# Patient Record
Sex: FEMALE | Race: WHITE | URBAN - METROPOLITAN AREA
[De-identification: names, ages, dates, MRNs, and addresses within clinical notes are randomized per-mention and may not be internally consistent; named-entity substitution may affect disease eponyms.]

---

## 2017-03-07 ENCOUNTER — HISTORICAL (OUTPATIENT)
Dept: ADMINISTRATIVE | Facility: HOSPITAL | Age: 82
End: 2017-03-07

## 2017-06-13 ENCOUNTER — HISTORICAL (OUTPATIENT)
Dept: ADMINISTRATIVE | Facility: HOSPITAL | Age: 82
End: 2017-06-13

## 2017-06-13 LAB
ALBUMIN SERPL-MCNC: 3.8 GM/DL (ref 3.4–5)
ALP SERPL-CCNC: 81 UNIT/L (ref 46–116)
ALT SERPL-CCNC: 32 UNIT/L (ref 12–78)
AST SERPL-CCNC: 28 UNIT/L (ref 15–37)
BILIRUB SERPL-MCNC: 0.4 MG/DL (ref 0.2–1)
BILIRUBIN DIRECT+TOT PNL SERPL-MCNC: 0.13 MG/DL (ref 0–0.2)
BILIRUBIN DIRECT+TOT PNL SERPL-MCNC: 0.27 MG/DL (ref 0–0.8)
CRP SERPL-MCNC: <0.2 MG/DL (ref 0–0.9)
ERYTHROCYTE [DISTWIDTH] IN BLOOD BY AUTOMATED COUNT: 13.7 % (ref 11.5–17)
ERYTHROCYTE [SEDIMENTATION RATE] IN BLOOD: 5 MM/HR (ref 0–20)
HCT VFR BLD AUTO: 38.1 % (ref 37–47)
HGB BLD-MCNC: 12.5 GM/DL (ref 12–16)
MCH RBC QN AUTO: 33.2 PG (ref 27–31)
MCHC RBC AUTO-ENTMCNC: 33 GM/DL (ref 33–36)
MCV RBC AUTO: 100.8 FL (ref 80–94)
PLATELET # BLD AUTO: 225 X10(3)/MCL (ref 130–400)
PMV BLD AUTO: 8.8 FL (ref 7.4–10.4)
PROT SERPL-MCNC: 6.5 GM/DL (ref 6.4–8.2)
RBC # BLD AUTO: 3.78 X10(6)/MCL (ref 4.2–5.4)
WBC # SPEC AUTO: 8.8 X10(3)/MCL (ref 4.5–11.5)

## 2017-07-21 ENCOUNTER — HISTORICAL (OUTPATIENT)
Dept: ADMINISTRATIVE | Facility: HOSPITAL | Age: 82
End: 2017-07-21

## 2017-08-01 LAB
BUN SERPL-MCNC: 17 MG/DL (ref 7–18)
CALCIUM SERPL-MCNC: 9.7 MG/DL (ref 9–10.9)
CHLORIDE SERPL-SCNC: 103 MMOL/L (ref 98–116)
CHOLEST SERPL-MCNC: 139 MG/DL
CO2 SERPL-SCNC: 29 MMOL/L (ref 15–28)
CREAT SERPL-MCNC: 1.01 MG/DL (ref 0.6–1.3)
GLUCOSE SERPL-MCNC: 95 MG/DL (ref 74–106)
HDLC SERPL-MCNC: 54 MG/DL (ref 35–60)
LDLC SERPL CALC-MCNC: 50 MG/DL
POTASSIUM SERPL-SCNC: 4.2 MMOL/L (ref 3.5–5.1)
SODIUM SERPL-SCNC: 145 MEQ/L (ref 131–145)
TRIGL SERPL-MCNC: 176 MG/DL (ref 30–150)

## 2017-08-14 ENCOUNTER — HISTORICAL (OUTPATIENT)
Dept: ADMINISTRATIVE | Facility: HOSPITAL | Age: 82
End: 2017-08-14

## 2017-08-14 LAB
ABS NEUT (OLG): 8.92 X10(3)/MCL (ref 2.1–9.2)
APTT PPP: 27.7 SECOND(S) (ref 20.6–36)
BASOPHILS # BLD AUTO: 0 X10(3)/MCL (ref 0–0.2)
BASOPHILS NFR BLD AUTO: 0 %
BUN SERPL-MCNC: 22 MG/DL (ref 7–18)
CALCIUM SERPL-MCNC: 9.3 MG/DL (ref 8.5–10.1)
CHLORIDE SERPL-SCNC: 103 MMOL/L (ref 98–107)
CO2 SERPL-SCNC: 27 MMOL/L (ref 21–32)
CREAT SERPL-MCNC: 1.02 MG/DL (ref 0.55–1.02)
ERYTHROCYTE [DISTWIDTH] IN BLOOD BY AUTOMATED COUNT: 13.3 % (ref 11.5–17)
GLUCOSE SERPL-MCNC: 124 MG/DL (ref 74–106)
HCT VFR BLD AUTO: 35.3 % (ref 37–47)
HGB BLD-MCNC: 11.5 GM/DL (ref 12–16)
INR PPP: 1.12 (ref 0–1.27)
LYMPHOCYTES # BLD AUTO: 0.7 X10(3)/MCL (ref 0.6–4.6)
LYMPHOCYTES NFR BLD AUTO: 6 %
MCH RBC QN AUTO: 32.6 PG (ref 27–31)
MCHC RBC AUTO-ENTMCNC: 32.6 GM/DL (ref 33–36)
MCV RBC AUTO: 100 FL (ref 80–94)
MONOCYTES # BLD AUTO: 0.4 X10(3)/MCL (ref 0.1–1.3)
MONOCYTES NFR BLD AUTO: 4 %
NEUTROPHILS # BLD AUTO: 8.92 X10(3)/MCL (ref 2.1–9.2)
NEUTROPHILS NFR BLD AUTO: 88 %
PLATELET # BLD AUTO: 210 X10(3)/MCL (ref 130–400)
PMV BLD AUTO: 10.6 FL (ref 9.4–12.4)
POTASSIUM SERPL-SCNC: 4.4 MMOL/L (ref 3.5–5.1)
PROTHROMBIN TIME: 14.2 SECOND(S) (ref 12.1–14.2)
RBC # BLD AUTO: 3.53 X10(6)/MCL (ref 4.2–5.4)
SODIUM SERPL-SCNC: 138 MMOL/L (ref 136–145)
WBC # SPEC AUTO: 10.1 X10(3)/MCL (ref 4.5–11.5)

## 2017-09-06 ENCOUNTER — HISTORICAL (OUTPATIENT)
Dept: ADMINISTRATIVE | Facility: HOSPITAL | Age: 82
End: 2017-09-06

## 2017-09-06 LAB
ALBUMIN SERPL-MCNC: 3.4 GM/DL (ref 3.4–5)
ALP SERPL-CCNC: 107 UNIT/L (ref 46–116)
ALT SERPL-CCNC: 35 UNIT/L (ref 12–78)
AST SERPL-CCNC: 25 UNIT/L (ref 15–37)
BILIRUB SERPL-MCNC: 0.6 MG/DL (ref 0.2–1)
BILIRUBIN DIRECT+TOT PNL SERPL-MCNC: 0.18 MG/DL (ref 0–0.2)
BILIRUBIN DIRECT+TOT PNL SERPL-MCNC: 0.42 MG/DL (ref 0–0.8)
CRP SERPL-MCNC: <0.2 MG/DL (ref 0–0.9)
ERYTHROCYTE [DISTWIDTH] IN BLOOD BY AUTOMATED COUNT: 14.1 % (ref 11.5–17)
ERYTHROCYTE [SEDIMENTATION RATE] IN BLOOD: 6 MM/HR (ref 0–20)
HCT VFR BLD AUTO: 37.6 % (ref 37–47)
HGB BLD-MCNC: 12.4 GM/DL (ref 12–16)
MCH RBC QN AUTO: 33.2 PG (ref 27–31)
MCHC RBC AUTO-ENTMCNC: 33.1 GM/DL (ref 33–36)
MCV RBC AUTO: 100.2 FL (ref 80–94)
PLATELET # BLD AUTO: 195 X10(3)/MCL (ref 130–400)
PMV BLD AUTO: 8.2 FL (ref 7.4–10.4)
PROT SERPL-MCNC: 6.3 GM/DL (ref 6.4–8.2)
RBC # BLD AUTO: 3.75 X10(6)/MCL (ref 4.2–5.4)
WBC # SPEC AUTO: 8.2 X10(3)/MCL (ref 4.5–11.5)

## 2017-09-21 ENCOUNTER — HISTORICAL (OUTPATIENT)
Dept: ADMINISTRATIVE | Facility: HOSPITAL | Age: 82
End: 2017-09-21

## 2017-10-14 ENCOUNTER — HISTORICAL (OUTPATIENT)
Dept: ADMINISTRATIVE | Facility: HOSPITAL | Age: 82
End: 2017-10-14

## 2017-10-14 LAB
ABS NEUT (OLG): 5.23 X10(3)/MCL (ref 2.1–9.2)
ALBUMIN SERPL-MCNC: 3.3 GM/DL (ref 3.4–5)
ALBUMIN/GLOB SERPL: 1.1 RATIO (ref 1.1–2)
ALP SERPL-CCNC: 101 UNIT/L (ref 38–126)
ALT SERPL-CCNC: 31 UNIT/L (ref 12–78)
APPEARANCE, UA: CLEAR
AST SERPL-CCNC: 25 UNIT/L (ref 15–37)
BACTERIA SPEC CULT: NORMAL /HPF
BASOPHILS NFR BLD MANUAL: 1 % (ref 0–2)
BILIRUB SERPL-MCNC: 0.5 MG/DL (ref 0.2–1)
BILIRUB UR QL STRIP: NEGATIVE
BILIRUBIN DIRECT+TOT PNL SERPL-MCNC: 0.2 MG/DL (ref 0–0.5)
BILIRUBIN DIRECT+TOT PNL SERPL-MCNC: 0.3 MG/DL (ref 0–0.8)
BUN SERPL-MCNC: 19 MG/DL (ref 7–18)
CALCIUM SERPL-MCNC: 9.2 MG/DL (ref 8.5–10.1)
CHLORIDE SERPL-SCNC: 109 MMOL/L (ref 98–107)
CO2 SERPL-SCNC: 27 MMOL/L (ref 21–32)
COLOR UR: YELLOW
CREAT SERPL-MCNC: 0.8 MG/DL (ref 0.55–1.02)
EOSINOPHIL NFR BLD MANUAL: 6 % (ref 0–8)
ERYTHROCYTE [DISTWIDTH] IN BLOOD BY AUTOMATED COUNT: 13.4 % (ref 11.5–17)
GLOBULIN SER-MCNC: 3.1 GM/DL (ref 2.4–3.5)
GLUCOSE (UA): NEGATIVE
GLUCOSE SERPL-MCNC: 94 MG/DL (ref 74–106)
HCT VFR BLD AUTO: 39.1 % (ref 37–47)
HGB BLD-MCNC: 13 GM/DL (ref 12–16)
HGB UR QL STRIP: NEGATIVE
KETONES UR QL STRIP: NEGATIVE
LEUKOCYTE ESTERASE UR QL STRIP: NEGATIVE
LYMPHOCYTES NFR BLD MANUAL: 1 %
LYMPHOCYTES NFR BLD MANUAL: 26 % (ref 13–40)
MAGNESIUM SERPL-MCNC: 2.3 MG/DL (ref 1.8–2.4)
MCH RBC QN AUTO: 34.1 PG (ref 27–31)
MCHC RBC AUTO-ENTMCNC: 33.2 GM/DL (ref 33–36)
MCV RBC AUTO: 102.6 FL (ref 80–94)
MONOCYTES NFR BLD MANUAL: 6 % (ref 2–11)
NEUTROPHILS NFR BLD MANUAL: 60 % (ref 47–80)
NITRITE UR QL STRIP: NEGATIVE
PH UR STRIP: 6 [PH] (ref 5–9)
PLATELET # BLD AUTO: 198 X10(3)/MCL (ref 130–400)
PLATELET # BLD EST: NORMAL 10*3/UL
PMV BLD AUTO: 10.2 FL (ref 7.4–10.4)
POTASSIUM SERPL-SCNC: 3.7 MMOL/L (ref 3.5–5.1)
PROT SERPL-MCNC: 6.4 GM/DL (ref 6.4–8.2)
PROT UR QL STRIP: NEGATIVE
RBC # BLD AUTO: 3.81 X10(6)/MCL (ref 4.2–5.4)
RBC #/AREA URNS HPF: NORMAL /[HPF]
SODIUM SERPL-SCNC: 144 MMOL/L (ref 136–145)
SP GR UR STRIP: 1.01 (ref 1–1.03)
SQUAMOUS EPITHELIAL, UA: NORMAL
T4 FREE SERPL-MCNC: 1.24 NG/DL (ref 0.76–1.46)
TSH SERPL-ACNC: 0.37 MIU/ML (ref 0.36–3.74)
UROBILINOGEN UR STRIP-ACNC: 0.2
WBC # SPEC AUTO: 9.9 X10(3)/MCL (ref 4.5–11.5)
WBC #/AREA URNS HPF: NORMAL /[HPF]

## 2017-11-03 ENCOUNTER — HISTORICAL (OUTPATIENT)
Dept: ADMINISTRATIVE | Facility: HOSPITAL | Age: 82
End: 2017-11-03

## 2017-11-10 ENCOUNTER — HISTORICAL (OUTPATIENT)
Dept: ADMINISTRATIVE | Facility: HOSPITAL | Age: 82
End: 2017-11-10

## 2017-11-10 LAB
ALBUMIN SERPL-MCNC: 3.5 GM/DL (ref 3.4–5)
ALP SERPL-CCNC: 83 UNIT/L (ref 46–116)
ALT SERPL-CCNC: 28 UNIT/L (ref 12–78)
AST SERPL-CCNC: 26 UNIT/L (ref 15–37)
BILIRUB SERPL-MCNC: 0.5 MG/DL (ref 0.2–1)
BILIRUBIN DIRECT+TOT PNL SERPL-MCNC: 0.16 MG/DL (ref 0–0.2)
BILIRUBIN DIRECT+TOT PNL SERPL-MCNC: 0.35 MG/DL (ref 0–0.8)
CRP SERPL-MCNC: 0.3 MG/DL (ref 0–0.9)
ERYTHROCYTE [DISTWIDTH] IN BLOOD BY AUTOMATED COUNT: 14.4 % (ref 11.5–17)
ERYTHROCYTE [SEDIMENTATION RATE] IN BLOOD: 10 MM/HR (ref 0–20)
HCT VFR BLD AUTO: 34.7 % (ref 37–47)
HGB BLD-MCNC: 11.3 GM/DL (ref 12–16)
MCH RBC QN AUTO: 32.8 PG (ref 27–31)
MCHC RBC AUTO-ENTMCNC: 32.5 GM/DL (ref 33–36)
MCV RBC AUTO: 101.1 FL (ref 80–94)
PLATELET # BLD AUTO: 231 X10(3)/MCL (ref 130–400)
PMV BLD AUTO: 8.2 FL (ref 7.4–10.4)
PROT SERPL-MCNC: 6.3 GM/DL (ref 6.4–8.2)
RBC # BLD AUTO: 3.44 X10(6)/MCL (ref 4.2–5.4)
WBC # SPEC AUTO: 8.3 X10(3)/MCL (ref 4.5–11.5)

## 2018-01-18 ENCOUNTER — HISTORICAL (OUTPATIENT)
Dept: ADMINISTRATIVE | Facility: HOSPITAL | Age: 83
End: 2018-01-18

## 2018-01-18 LAB
ALBUMIN SERPL-MCNC: 3.5 GM/DL (ref 3.4–5)
ALP SERPL-CCNC: 89 UNIT/L (ref 46–116)
ALT SERPL-CCNC: 30 UNIT/L (ref 12–78)
AST SERPL-CCNC: 22 UNIT/L (ref 15–37)
BILIRUB SERPL-MCNC: 0.4 MG/DL (ref 0.2–1)
BILIRUBIN DIRECT+TOT PNL SERPL-MCNC: 0.11 MG/DL (ref 0–0.2)
BILIRUBIN DIRECT+TOT PNL SERPL-MCNC: 0.27 MG/DL (ref 0–0.8)
CRP SERPL-MCNC: 0.3 MG/DL (ref 0–0.9)
ERYTHROCYTE [DISTWIDTH] IN BLOOD BY AUTOMATED COUNT: 13.8 % (ref 11.5–17)
ERYTHROCYTE [SEDIMENTATION RATE] IN BLOOD: 7 MM/HR (ref 0–20)
HCT VFR BLD AUTO: 38.1 % (ref 37–47)
HGB BLD-MCNC: 12.5 GM/DL (ref 12–16)
MCH RBC QN AUTO: 32.4 PG (ref 27–31)
MCHC RBC AUTO-ENTMCNC: 32.9 GM/DL (ref 33–36)
MCV RBC AUTO: 98.7 FL (ref 80–94)
PLATELET # BLD AUTO: 205 X10(3)/MCL (ref 130–400)
PMV BLD AUTO: 8.1 FL (ref 7.4–10.4)
PROT SERPL-MCNC: 6.7 GM/DL (ref 6.4–8.2)
RBC # BLD AUTO: 3.86 X10(6)/MCL (ref 4.2–5.4)
WBC # SPEC AUTO: 8.2 X10(3)/MCL (ref 4.5–11.5)

## 2018-02-04 ENCOUNTER — HISTORICAL (OUTPATIENT)
Dept: ADMINISTRATIVE | Facility: HOSPITAL | Age: 83
End: 2018-02-04

## 2018-02-06 ENCOUNTER — HISTORICAL (OUTPATIENT)
Dept: ADMINISTRATIVE | Facility: HOSPITAL | Age: 83
End: 2018-02-06

## 2018-03-09 ENCOUNTER — HISTORICAL (OUTPATIENT)
Dept: ADMINISTRATIVE | Facility: HOSPITAL | Age: 83
End: 2018-03-09

## 2018-06-20 ENCOUNTER — HISTORICAL (OUTPATIENT)
Dept: ADMINISTRATIVE | Facility: HOSPITAL | Age: 83
End: 2018-06-20

## 2018-10-31 ENCOUNTER — HISTORICAL (OUTPATIENT)
Dept: ADMINISTRATIVE | Facility: HOSPITAL | Age: 83
End: 2018-10-31

## 2018-11-27 ENCOUNTER — HISTORICAL (OUTPATIENT)
Dept: ADMINISTRATIVE | Facility: HOSPITAL | Age: 83
End: 2018-11-27

## 2018-11-29 ENCOUNTER — HISTORICAL (OUTPATIENT)
Dept: ADMINISTRATIVE | Facility: HOSPITAL | Age: 83
End: 2018-11-29

## 2019-01-31 ENCOUNTER — HISTORICAL (OUTPATIENT)
Dept: ADMINISTRATIVE | Facility: HOSPITAL | Age: 84
End: 2019-01-31

## 2019-01-31 LAB
ALBUMIN SERPL-MCNC: 3.5 GM/DL (ref 3.4–5)
ALP SERPL-CCNC: 81 UNIT/L (ref 46–116)
ALT SERPL-CCNC: 29 UNIT/L (ref 12–78)
AST SERPL-CCNC: 21 UNIT/L (ref 15–37)
BILIRUB SERPL-MCNC: 0.5 MG/DL (ref 0.2–1)
BILIRUBIN DIRECT+TOT PNL SERPL-MCNC: 0.12 MG/DL (ref 0–0.2)
BILIRUBIN DIRECT+TOT PNL SERPL-MCNC: 0.38 MG/DL (ref 0–0.8)
CRP SERPL-MCNC: <0.2 MG/DL (ref 0–0.9)
ERYTHROCYTE [DISTWIDTH] IN BLOOD BY AUTOMATED COUNT: 13.6 % (ref 11.5–17)
ERYTHROCYTE [SEDIMENTATION RATE] IN BLOOD: 8 MM/HR (ref 0–20)
HCT VFR BLD AUTO: 36.7 % (ref 37–47)
HGB BLD-MCNC: 11.9 GM/DL (ref 12–16)
MCH RBC QN AUTO: 34.4 PG (ref 27–31)
MCHC RBC AUTO-ENTMCNC: 32.4 GM/DL (ref 33–36)
MCV RBC AUTO: 106.1 FL (ref 80–94)
PLATELET # BLD AUTO: 219 X10(3)/MCL (ref 130–400)
PMV BLD AUTO: 10.1 FL (ref 9.4–12.4)
PROT SERPL-MCNC: 6.5 GM/DL (ref 6.4–8.2)
RBC # BLD AUTO: 3.46 X10(6)/MCL (ref 4.2–5.4)
WBC # SPEC AUTO: 8.8 X10(3)/MCL (ref 4.5–11.5)

## 2019-03-12 ENCOUNTER — HISTORICAL (OUTPATIENT)
Dept: ADMINISTRATIVE | Facility: HOSPITAL | Age: 84
End: 2019-03-12

## 2019-03-27 ENCOUNTER — HISTORICAL (OUTPATIENT)
Dept: ADMINISTRATIVE | Facility: HOSPITAL | Age: 84
End: 2019-03-27

## 2019-03-27 LAB
ALBUMIN SERPL-MCNC: 3.7 GM/DL (ref 3.4–5)
ALP SERPL-CCNC: 66 UNIT/L (ref 46–116)
ALT SERPL-CCNC: 30 UNIT/L (ref 12–78)
AST SERPL-CCNC: 18 UNIT/L (ref 15–37)
BILIRUB SERPL-MCNC: 0.4 MG/DL (ref 0.2–1)
BILIRUBIN DIRECT+TOT PNL SERPL-MCNC: 0.16 MG/DL (ref 0–0.2)
BILIRUBIN DIRECT+TOT PNL SERPL-MCNC: 0.24 MG/DL (ref 0–0.8)
CRP SERPL-MCNC: <0.2 MG/DL (ref 0–0.9)
ERYTHROCYTE [DISTWIDTH] IN BLOOD BY AUTOMATED COUNT: 12.6 % (ref 11.5–17)
ERYTHROCYTE [SEDIMENTATION RATE] IN BLOOD: 2 MM/HR (ref 0–20)
HCT VFR BLD AUTO: 38 % (ref 37–47)
HGB BLD-MCNC: 12.5 GM/DL (ref 12–16)
MCH RBC QN AUTO: 33.9 PG (ref 27–31)
MCHC RBC AUTO-ENTMCNC: 32.9 GM/DL (ref 33–36)
MCV RBC AUTO: 103 FL (ref 80–94)
PLATELET # BLD AUTO: 241 X10(3)/MCL (ref 130–400)
PMV BLD AUTO: 9.5 FL (ref 9.4–12.4)
PROT SERPL-MCNC: 6.6 GM/DL (ref 6.4–8.2)
RBC # BLD AUTO: 3.69 X10(6)/MCL (ref 4.2–5.4)
WBC # SPEC AUTO: 9.3 X10(3)/MCL (ref 4.5–11.5)

## 2019-04-10 ENCOUNTER — HISTORICAL (OUTPATIENT)
Dept: ADMINISTRATIVE | Facility: HOSPITAL | Age: 84
End: 2019-04-10

## 2019-04-10 LAB
ALBUMIN SERPL-MCNC: 3.7 GM/DL (ref 3.4–5)
ALP SERPL-CCNC: 64 UNIT/L (ref 46–116)
ALT SERPL-CCNC: 32 UNIT/L (ref 12–78)
AST SERPL-CCNC: 22 UNIT/L (ref 15–37)
BILIRUB SERPL-MCNC: 0.4 MG/DL (ref 0.2–1)
BILIRUBIN DIRECT+TOT PNL SERPL-MCNC: 0.14 MG/DL (ref 0–0.2)
BILIRUBIN DIRECT+TOT PNL SERPL-MCNC: 0.26 MG/DL (ref 0–0.8)
CHOLEST SERPL-MCNC: 161 MG/DL (ref 0–200)
CHOLEST/HDLC SERPL: 2.6 {RATIO} (ref 0–4)
HDLC SERPL-MCNC: 61 MG/DL (ref 40–60)
LDLC SERPL CALC-MCNC: 76 MG/DL (ref 0–129)
PROT SERPL-MCNC: 6.7 GM/DL (ref 6.4–8.2)
TRIGL SERPL-MCNC: 121 MG/DL
VLDLC SERPL CALC-MCNC: 24 MG/DL

## 2019-12-16 ENCOUNTER — HISTORICAL (OUTPATIENT)
Dept: ADMINISTRATIVE | Facility: HOSPITAL | Age: 84
End: 2019-12-16

## 2019-12-16 LAB — TSH SERPL-ACNC: 1.13 MIU/ML (ref 0.36–3.74)

## 2020-02-14 ENCOUNTER — HISTORICAL (OUTPATIENT)
Dept: ADMINISTRATIVE | Facility: HOSPITAL | Age: 85
End: 2020-02-14

## 2020-03-13 ENCOUNTER — HISTORICAL (OUTPATIENT)
Dept: ADMINISTRATIVE | Facility: HOSPITAL | Age: 85
End: 2020-03-13

## 2020-03-16 ENCOUNTER — HISTORICAL (OUTPATIENT)
Dept: ADMINISTRATIVE | Facility: HOSPITAL | Age: 85
End: 2020-03-16

## 2020-06-04 ENCOUNTER — HISTORICAL (OUTPATIENT)
Dept: ADMINISTRATIVE | Facility: HOSPITAL | Age: 85
End: 2020-06-04

## 2020-06-29 ENCOUNTER — HISTORICAL (OUTPATIENT)
Dept: ADMINISTRATIVE | Facility: HOSPITAL | Age: 85
End: 2020-06-29

## 2020-07-17 ENCOUNTER — HISTORICAL (OUTPATIENT)
Dept: ADMINISTRATIVE | Facility: HOSPITAL | Age: 85
End: 2020-07-17

## 2020-09-01 ENCOUNTER — HISTORICAL (OUTPATIENT)
Dept: ADMINISTRATIVE | Facility: HOSPITAL | Age: 85
End: 2020-09-01

## 2020-09-25 ENCOUNTER — HISTORICAL (OUTPATIENT)
Dept: ADMINISTRATIVE | Facility: HOSPITAL | Age: 85
End: 2020-09-25

## 2020-11-05 ENCOUNTER — HISTORICAL (OUTPATIENT)
Dept: ADMINISTRATIVE | Facility: HOSPITAL | Age: 85
End: 2020-11-05

## 2020-12-08 ENCOUNTER — HISTORICAL (OUTPATIENT)
Dept: ADMINISTRATIVE | Facility: HOSPITAL | Age: 85
End: 2020-12-08

## 2020-12-15 ENCOUNTER — HISTORICAL (OUTPATIENT)
Dept: ADMINISTRATIVE | Facility: HOSPITAL | Age: 85
End: 2020-12-15

## 2022-09-13 NOTE — HISTORICAL [facility] CERNER
This is a historical note converted from Lily. Formatting and pictures may have been removed.  Please reference Lily for original formatting and attached multimedia. Chief Complaint  bilateral hip pain. no injury, pain for 3 weeks. Went to urgent care in [place] and again [facility], xrays taken and given an injection.  History of Present Illness  Patient comes in today complaining of bilateral hip pain. ?Patient states she also has some pain in her lower back. ?She denies any?specific trauma or recent falls. ?She is present here with family.? She will ambulate with a walker.? She denies any specific numbness or tingling down either legs though she does have pain going from her buttocks into her thighs.? She had a hip replacement in the past. ?She denies any groin pain. ?She denies any bowel bladder dysfunction.? This is been going on for the last 3+ weeks. ?She has had an injection with minimal relief.  Physical Exam  Vitals & Measurements  HR:?60(Peripheral)? BP:?178/87?  HT:?150?cm? HT:?150?cm? WT:?73.48?kg? WT:?73.48?kg? BMI:?32.66?  Bilateral lower extremities warm soft and warm. ?Skin is intact with no signs or symptoms of DVT or infection. ?She has no pain with motion of either hip she has a negative Stincfield exam bilaterally she has no groin pain. ?She is tender about the paraspinal muscles both on the left and right side as well as along the trochanteric area.? She is able to forward flex 60 degrees and extend 10 degrees. ?She is able to rotate 30 degrees. ?She has 5 out of 5 strength in L2 through S1 states intact light touch negative clonus?she does walk with a slight hesitant gait. ?X-rays?4 views of the lumbar spine demonstrates generalized spondylosis and degenerative disc disease.  Assessment/Plan  1.?Lumbar spondylosis?M47.816  ? At this time we discussed her physical exam and x-ray findings. ?I suspect her pain is likely coming from her lower back, her pain is symmetric?going into her hips and  thigh area. ?We have discussed starting off with physical therapy, she will continue anti-inflammatories with appropriate precautions. ?She will continue with her walker. ?I would like to see her back in 4 weeks to see how she is progressing.? Patient states she will call or return sooner if there is any new problems or difficulties.  Ordered:  1036F Current tobacco non-user, 11/29/18 9:17:00 CST  1125F Pain severity quantified, pain present, 11/29/18 9:17:00 CST  1170F Functional Status Assessment, 11/29/18 9:17:00 CST  3008F Body Mass Index (BMI), documented, 11/29/18 9:17:00 CST  3077F Most recent systolic blood pressure, greater than or equal to 140 mm Hg, 11/29/18 9:17:00 CST  3079F Most recent diastolic blood pressure, 80 - 89 mm Hg, 11/29/18 9:17:00 CST  Clinic Follow up, *Est. 12/27/18 3:00:00 CST, Order for future visit, Lumbar spondylosis  Lumbar degenerative disc disease, [facility] BB Sat  Office/Outpatient Visit Level 4 Established [id number] PC, Lumbar spondylosis  Lumbar degenerative disc disease, [facility], 11/29/18 9:17:00 CST  PT/OT External Referral, 11/29/18 9:17:00 CST, Lumbar spondylosis  Lumbar degenerative disc disease, Evaluate and Treat, 3 X Week, Patient has IV, Patient on O2, Droplet Precautions  ?  2.?Lumbar degenerative disc disease?M51.36  Ordered:  1036F Current tobacco non-user, 11/29/18 9:17:00 CST  1125F Pain severity quantified, pain present, 11/29/18 9:17:00 CST  1170F Functional Status Assessment, 11/29/18 9:17:00 CST  3008F Body Mass Index (BMI), documented, 11/29/18 9:17:00 CST  3077F Most recent systolic blood pressure, greater than or equal to 140 mm Hg, 11/29/18 9:17:00 CST  3079F Most recent diastolic blood pressure, 80 - 89 mm Hg, 11/29/18 9:17:00 CST  Clinic Follow up, *Est. 12/27/18 3:00:00 CST, Order for future visit, Lumbar spondylosis  Lumbar degenerative disc disease, [facility] BB Sat  Office/Outpatient Visit Level 4 Established [id number] PC, Lumbar spondylosis   Lumbar degenerative disc disease, [facility], 11/29/18 9:17:00 CST  PT/OT External Referral, 11/29/18 9:17:00 CST, Lumbar spondylosis  Lumbar degenerative disc disease, Evaluate and Treat, 3 X Week, Patient has IV, Patient on O2, Droplet Precautions  ?  Bilateral low back pain?M54.5  ?   Problem List/Past Medical History  Ongoing  Abnormal liver function tests(  Confirmed  )  Arthritis, rheumatoid  CAD (coronary artery disease)  Carotid artery disease  Constipation  H/O: viral illness(  Confirmed  )  [place] - Hard of hearing  Hyperlipidemia  Hypertension, essential(  Confirmed  )  Hypothyroidism(  Confirmed  )  Lumbar spondylosis  Macular degeneration of both eyes  Ongoing use of possibly toxic medication  Osteoporosis(  Confirmed  )  Peripheral neuropathy, idiopathic  Polyp of colon  Stented coronary artery  Historical  Aortic valve insufficiency  CAD  Carotid artery stenosis  Cataracts  Dyslipidemia  Htn  Hypothyroid  Macular degeneration of both eyes  MI - Myocardial infarction  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  RA  Thyroid disease  Procedure/Surgical History  Appendectomy (1945)  cataract surgery  H/O heart artery stent  hand surgery  History of bilateral hip replacements  History of cholecystectomy  hysterectomy  right hand  right wrist fusion   Medications  amLODIPine 5 mg oral tablet, See Instructions, 3 refills  aspirin 81 mg oral tablet, CHEWABLE, 81 mg= 1 tab(s), Oral, Daily  Calcium 600+D, Oral, At Bedtime  DuoNeb 0.5 mg-2.5 mg/3 mL inhalation solution, 3 mL, INH, QID  Fish Oil oral capsule, 1 cap(s), Oral, Daily  [place] oral tablet, See Instructions, 3 refills  gabapentin 300 mg oral capsule, 300 mg= 1 cap(s), Oral, At Bedtime, 11 refills  Humira 40 mg/0.8 mL subcutaneous kit, 40 mg= 0.8 mL, Subcutaneous, q2wk  isosorbide MONOnitrate 30 mg oral tablet, Extended Release, 30 mg= 1 tab(s), Oral, Daily  levothyroxine 75 mcg (0.075 mg) oral tablet, 75 mcg= 1 tab(s), Oral, Daily  Linzess 290 mcg oral  capsule, 290 mcg= 1 cap(s), Oral, Daily, 3 refills  lutein 20 mg oral tablet, 20 mg= 1 tab(s), Oral, Daily  methotrexate 2.5 mg oral tablet  metoprolol TARTrate 50 mg oral tablet (for Lopressor), 50 mg= 1 tab(s), Oral, BID  omeprazole 40 mg oral DR capsule, See Instructions, 3 refills  prednisONE 20 mg oral tablet, 20 mg= 1 tab(s), Oral, Daily  ranitidine 300 mg oral tablet, See Instructions, 3 refills  simvastatin 20 mg oral tablet, 20 mg= 1 tab(s), Oral, qPM, 3 refills  Thera-M oral tablet, 1 tab(s), Oral, Daily  traMADol 50 mg oral tablet, 50 mg= 1 tab(s), Oral, q4hr, PRN  Valium 5 mg oral tablet, See Instructions  vitamin A 10,000 units oral capsule  Vitamin B12, Oral, Daily  Vitamin C 1000 mg oral tablet, 1000 mg= 1 tab(s), Oral, Daily  Allergies  Darvocet A500?(upset stomach)  Mobic?(upset stomach)  Norco?(upset stomach)  Percocet 10/325?(upset stomach)  Percodan?(upset stomach)  Robaxin?(upset stomach)  oxyCODONE?(upset stomach)  zolpidem?(amnesia)  Social History  Alcohol - Denies Alcohol Use, 12/03/2013  Past, 1-2 times per year, 02/04/2018  Current, 1-2 times per month, 08/04/2015  Employment/School  Retired, 08/04/2015  Exercise  Exercise duration: 0., 08/04/2015  Home/Environment  Lives with Alone., 08/04/2015  Nutrition/Health  Regular, 08/04/2015  Sexual  Sexually active: No., 08/04/2016  Substance Abuse - Denies Substance Abuse, 12/03/2013  Never, 08/04/2015  Tobacco - Denies Tobacco Use, 12/03/2013  Former smoker, N/A, 11/29/2018  Former smoker, N/A, Stopped age 31 Years. Previous treatment: None., 11/23/2018  Family History  Acute myocardial infarction: Negative: Mother.  Cancer: Sister.  Cancer: Father.  Heart attack.: Brother.  Primary malignant neoplasm of lung: Brother.Negative: Father and Sister.  Stroke: Sister.  Immunizations  Vaccine Date Status Comments   pneumococcal 13-valent conjugate vaccine 01/16/2018 Given    influenza virus vaccine, inactivated 10/2017 Recorded [1/16/2018] per  patient is october   influenza virus vaccine, inactivated 10/18/2016 Given    influenza virus vaccine, inactivated 10/30/2015 Recorded    tetanus toxoid 05/28/2015 Recorded [8/4/2015] 0.5ML AT [facility]   zoster vaccine live 05/28/2015 Recorded [8/4/2015] GIVEN AT [facility] OUT OF STATE   influenza virus vaccine, inactivated 10/07/2014 Recorded    pneumococcal 23-polyvalent vaccine 06/15/2005 Recorded    Health Maintenance  Health Maintenance  ???Pending?(in the next year)  ??? ??OverDue  ??? ? ? ?Coronary Artery Disease Maintenance-Antiplatelet Agent Prescribed due??and every?  ??? ? ? ?Coronary Artery Disease Maintenance-Lipid Lowering Therapy due??and every?  ??? ? ? ?Pneumococcal Vaccine due??and every?  ??? ??Due?  ??? ? ? ?ADL Screening due??11/30/18??and every 1??year(s)  ??? ? ? ?Cognitive Screening due??11/30/18??and every 1??year(s)  ??? ? ? ?Geriatric Depression Screening due??11/30/18??and every 1??year(s)  ??? ??Due In Future?  ??? ? ? ?Hypertension Management-Education not due until??01/22/19??and every 6??month(s)  ??? ? ? ?Functional Assessment not due until??06/12/19??and every 1??year(s)  ??? ? ? ?Advance Directive not due until??09/03/19??and every 1??year(s)  ??? ? ? ?Hypertension Management-BMP not due until??10/31/19??and every 1??year(s)  ??? ? ? ?Hypertension Management-Blood Pressure not due until??11/29/19??and every 1??year(s)  ??? ? ? ?Fall Risk Assessment not due until??11/29/19??and every 1??year(s)  ??? ? ? ?Obesity Screening not due until??11/29/19??and every 1??year(s)  ???Satisfied?(in the past 1 year)  ??? ??Satisfied?  ??? ? ? ?Advance Directive on??09/03/18.??Satisfied by Baldomero Johnston  ??? ? ? ?Blood Pressure Screening on??11/29/18.??Satisfied by Marisabel Hein LPN  ??? ? ? ?Body Mass Index Check on??11/29/18.??Satisfied by Marisabel Hein LPN  ??? ? ? ?Breast Cancer Screening on??03/09/18.??Satisfied by Brenda Moyer  ??? ? ? ?Coronary Artery  Disease Maintenance-Lipid Lowering Therapy on??06/12/18.??Satisfied by Jose L Valenzuela MD  ??? ? ? ?Depression Screening on??02/07/18.??Satisfied by Allyson Mckeon LPN  ??? ? ? ?Diabetes Screening on??10/31/18.??Satisfied by Flor Dukes  ??? ? ? ?Fall Risk Assessment on??11/29/18.??Satisfied by Marisabel Heni LPN  ??? ? ? ?Functional Assessment on??06/12/18.??Satisfied by Belinda Rhodes LPN  ??? ? ? ?Hypertension Management-Blood Pressure on??11/29/18.??Satisfied by Marisabel Hein LPN  ??? ? ? ?Lipid Screening on??07/20/18.??Satisfied by Gretchen Hdz  ??? ? ? ?Obesity Screening on??11/29/18.??Satisfied by Marisabel Hein LPN  ??? ? ? ?Pneumococcal Vaccine on??01/16/18.??Satisfied by Belinda Rhodes LPN  ??? ? ? ?Smoking Cessation on??02/06/18.??Satisfied by Anna Feng RN  ??? ? ? ?Smoking Cessation (Coronary Artery Disease) on??02/06/18.??Satisfied by Anna Feng RN  ?  ?

## 2022-09-13 NOTE — HISTORICAL [facility] CERNER
This is a historical note converted from Lily. Formatting and pictures may have been removed.  Please reference Lily for original formatting and attached multimedia. Chief Complaint  hx of seizures, keppra dose decreased yesterday. found on floor by family this AM. pt denies hitting head.  Reason for Consultation  iph  History of Present Illness  found on the floor of her bedroom yesterday AM  was more confused & disoriented prior to going to be the night of?12/14  was dragging her RLE while ambulating on the evening of 12/14  ct head: lg L frontoparietal & BG parenchymal hemorrhage w/ surrounding edema & 3mm R shift  upon arrival to ICU, she was minimally responsive to verbal/tactile, somnolent; opened her eyes w/ repeated stimulation & was moving her L side spont; she would follow simple commands on repeated requests  ?  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  on keppra 1g bid, asa & plavix at home  ?  appears that Rhp, R UMN facial weakness & exp aphasia are deficits from cva in nov 2020  keppra was started during that hospitalization  ?  she is a patient of dr. aceves  ?  seen by nsgy - no plans for sx intervention at this time; given ddavp & platelets  Review of Systems  uto  Physical Exam  Vitals & Measurements  T:?37.5? ?C (Oral)? TMIN:?37.3? ?C (Oral)? TMAX:?38.3? ?C (Oral)? HR:?101(Monitored)? HR:?97(Peripheral)? RR:?16? BP:?134/58? SpO2:?92%?  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  ?  nsgy following, antiplatelets on hold  cont?keppra 1g bid  family has requested DNR status  further to follow from   [facility]   Problem List/Past Medical History  Ongoing  Abnormal liver function tests(  Confirmed  )  Abscess of hand  Arthritis, rheumatoid  CAD (coronary artery disease)  Carotid artery disease  Constipation  Foreign body in hand  H/O: viral illness(  Confirmed  )  [place] - Hard of hearing  Hyperlipidemia  Hypertension, essential(  Confirmed  )  Hypothyroidism(  Confirmed  )  Lumbar spondylosis  Macular degeneration of both eyes  Ongoing use of possibly toxic medication  Osteoporosis(  Confirmed  )  Peripheral neuropathy, idiopathic  Polyp of colon  Stented coronary artery  Historical  Aortic valve insufficiency  CAD  Carotid artery stenosis  Cataracts  Dyslipidemia  Htn  Hypothyroid  Macular degeneration of both eyes  MI - Myocardial infarction  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  RA  Thyroid disease  Procedure/Surgical History  Catheter placement in coronary artery(s) for coronary angiography, including intraprocedural injection(s) for coronary angiography, imaging supervision and interpretation; with left heart catheterization including intraprocedural injection(s) for left jackson (08/03/2020)  Fluoroscopy of Left Heart using Low Osmolar Contrast (08/03/2020)  Fluoroscopy of Multiple Coronary Arteries using Low Osmolar Contrast (08/03/2020)  Measurement of Cardiac Sampling and Pressure, Left Heart, Percutaneous Approach (08/03/2020)  Catheter placement in coronary artery(s) for coronary angiography, including intraprocedural injection(s) for coronary angiography, imaging supervision and interpretation; with left heart catheterization including intraprocedural injection(s) for left jackson (03/16/2020)  Dilation of Coronary Artery, One Artery with Two Drug-eluting Intraluminal Devices, Percutaneous Approach (03/16/2020)  Endoluminal imaging of coronary vessel or graft using intravascular ultrasound (IVUS) or optical coherence tomography (OCT) during diagnostic evaluation and/or therapeutic intervention including imaging  supervision, interpretation and report; initial vess (03/16/2020)  Fluoroscopy of Left Heart using Low Osmolar Contrast (03/16/2020)  Fluoroscopy of Multiple Coronary Arteries using Low Osmolar Contrast (03/16/2020)  Measurement of Cardiac Sampling and Pressure, Left Heart, Percutaneous Approach (03/16/2020)  Percutaneous transcatheter placement of drug eluting intracoronary stent(s), with coronary angioplasty when performed; single major coronary artery or branch (03/16/2020)  Ultrasonography of Single Coronary Artery, Intravascular (03/16/2020)  Catheter placement in coronary artery(s) for coronary angiography, including intraprocedural injection(s) for coronary angiography, imaging supervision and interpretation; with left heart catheterization including intraprocedural injection(s) for left jackson (08/15/2017)  Fluoroscopy of Abdominal Aorta using Low Osmolar Contrast (08/15/2017)  Fluoroscopy of Left Heart using Low Osmolar Contrast (08/15/2017)  Fluoroscopy of Multiple Coronary Arteries using Low Osmolar Contrast (08/15/2017)  Measurement of Cardiac Sampling and Pressure, Left Heart, Percutaneous Approach (08/15/2017)  Appendectomy (1945)  cataract surgery  colonoscopy  H/O heart artery stent  hand surgery  History of bilateral hip replacements  History of cholecystectomy  hysterectomy  mamogram  right hand  right wrist fusion   Medications  Inpatient  atorvastatin 40 mg oral tablet, 80 mg= 2 tab(s), Oral, Daily  Cleviprex 0.5 mg/mL intravenous emulsion 25 mg, 25 mg= 50 mL, IV  Colace 100 mg oral capsule, 100 mg= 1 cap(s), Oral, BID, PRN  hydrALAZINE, 10 mg= 0.5 mL, IV Push, q4hr, PRN  Keppra 500 mg oral tablet, 1000 mg= 2 tab(s), Oral, BID  labetalol, 10 mg= 2 mL, IV Push, q10min, PRN  levothyroxine 88 mcg (0.088 mg) oral tablet, 88 mcg= 1 tab(s), Oral, Daily  lidocaine 1% injectable solution, 5 mL, Subcutaneous, Once  MiraLax (polyethylene glycol 3350), 17 gm= 1 packet(s), Oral, BID, PRN  Protonix, 40 mg= 1 EA,  IV Slow, Daily  Sodium Chloride 0.9% intravenous solution 1,000 mL, 1000 mL, IV  Sodium Chloride 0.9% PF vial (For PICC Flush), 10 mL, IV Push, As Directed, PRN  Sodium Chloride 0.9% PF vial (For PICC Flush), 20 mL, IV Push, As Directed, PRN  Sodium Chloride 0.9% PF vial (For PICC Flush), 10 mL, IV Push, q12hr  Surfak 240 mg cap. (docusate calcium), 240 mg= 1 cap(s), Oral, q24hr  Home  acetaminophen-codeine 300 mg-30 mg oral tablet, 1 tab(s), Oral, q6hr, PRN,? ?Not taking  amLODIPine 2.5 mg oral tablet, 2.5 mg= 1 tab(s), Oral, Daily  aspirin 81 mg oral Delayed Release (EC) tablet, 81 mg= 1 tab(s), Oral, Daily, 4 refills  Calcium 600+D, Oral, At Bedtime  Coreg 12.5 mg oral tablet, 12.5 mg= 1 tab(s), Oral, BIDWMeal, 11 refills  Fish Oil oral capsule, 1 cap(s), Oral, Daily  folic acid 1 mg oral tablet, 1 mg= 1 tab(s), Oral, Daily, 3 refills  gabapentin 300 mg oral capsule, 300 mg= 1 cap(s), Oral, TID, 1 refills  Humira 40 mg/0.8 mL subcutaneous kit, 40 mg= 0.8 mL, Subcutaneous, q2wk,? ?Not taking  Humira Pre-Filled Syringe 40 mg/0.4 mL subcutaneous kit  isosorbide MONOnitrate 60 mg oral tablet, Extended Release, 60 mg= 1 tab(s), Oral, Daily  Keppra 500 mg oral tablet, 1000 mg= 2 tab(s), Oral, BID, 4 refills  levothyroxine 88 mcg (0.088 mg) oral tablet, 88 mcg= 1 tab(s), Oral, Daily  lutein 20 mg oral tablet, 20 mg= 1 tab(s), Oral, Daily  methotrexate 2.5 mg oral tablet  metoprolol tartrate 50 mg oral tab, 50 mg= 1 tab(s), Oral, BID,? ?Not taking  nitroglycerin 0.4 mg sublingual TAB, 0.4 mg= 1 tab(s), SL, q5min, PRN  Pantoprazole 40 mg ORAL EC-Tablet, 40 mg= 1 tab(s), Oral, Daily, 1 refills  Plavix 75 mg oral tablet, 75 mg= 1 tab(s), Oral, Daily, 4 refills  prednisONE 5 mg oral tablet, 5 mg= 1 tab(s), Oral, Daily  rosuvastatin 20 mg oral tablet, 20 mg= 1 tab(s), Oral, qPM  Thera-M oral tablet, Oral, Daily  traMADol 50 mg oral tablet, 50 mg= 1 tab(s), Oral, q6hr,? ?Not taking  Vitamin C 1000 mg oral tablet, 1000 mg= 1  tab(s), Oral, Daily  Zyrtec 10 mg oral tablet, 10 mg= 1 tab(s), Oral, Daily  Allergies  Darvocet A500?(upset stomach)  Mobic?(upset stomach)  Norco?(upset stomach)  Percocet 10/325?(upset stomach)  Percodan?(upset stomach)  Robaxin?(upset stomach)  oxyCODONE?(upset stomach)  zolpidem?(amnesia)  Social History  Abuse/Neglect  No, 11/11/2020  No, No, Yes, 11/05/2020  No, No, Yes, 11/04/2020  No, 08/03/2020  Alcohol - Denies Alcohol Use, 12/03/2013  Current, 1-2 times per month, 08/04/2015  Employment/School  Retired, 08/04/2015  Exercise  Exercise duration: 0., 08/04/2015  Home/Environment  Lives with Alone., 08/04/2015  Nutrition/Health  Regular, 08/04/2015  Sexual  Sexually active: No., 08/04/2016  Substance Use - Denies Substance Abuse, 12/03/2013  Never, 06/15/2020  Never, 08/04/2015  Tobacco - Denies Tobacco Use, 12/03/2013  Never (less than 100 in lifetime), No, 12/14/2020  Family History  Acute myocardial infarction: Negative: Mother.  Cancer: Father.  Cancer: Sister.  Heart attack.: Brother.  Primary malignant neoplasm of lung: Brother.Negative: Father and Sister.  Stroke: Sister.  Immunizations  Vaccine Date Status Comments   influenza virus vaccine, inactivated 10/06/2020 Given    influenza virus vaccine, inactivated 10/10/2019 Recorded    tetanus/diphtheria/pertussis, acel(Tdap) 10/06/2019 Given    measles/mumps/rubella virus vaccine 05/03/2019 Recorded    influenza virus vaccine, inactivated 10/16/2018 Recorded    pneumococcal 13-valent conjugate vaccine 01/16/2018 Given    influenza virus vaccine, inactivated 10/2017 Recorded [1/16/2018] per patient is october   influenza virus vaccine, inactivated 10/18/2016 Given    influenza virus vaccine, inactivated 10/30/2015 Recorded    tetanus toxoid 05/28/2015 Recorded [8/4/2015] 0.5ML AT [facility]   zoster vaccine live 05/28/2015 Recorded [8/4/2015] GIVEN AT [facility] OUT OF STATE   influenza virus vaccine, inactivated 10/07/2014 Recorded     pneumococcal 23-polyvalent vaccine 06/15/2005 Recorded    Lab Results  Test Name Test Result Date/Time Comments   Sodium Lvl 145 mmol/L 12/16/2020 08:17 CST    Potassium Lvl 3.8 mmol/L 12/16/2020 08:17 CST    Chloride 112 mmol/L (High) 12/16/2020 08:17 CST    CO2 23 mmol/L 12/16/2020 08:17 CST    Calcium Lvl 9.0 mg/dL 12/16/2020 08:17 CST    Glucose Lvl 144 mg/dL (High) 12/16/2020 08:17 CST    BUN 19.8 mg/dL 12/16/2020 08:17 CST    Creatinine 0.84 mg/dL 12/16/2020 08:17 CST    Chol 220 mg/dL (High) 12/16/2020 04:10 CST    HDL 62 mg/dL (High) 12/16/2020 04:10 CST Expected Values:  ?  Major risk factor for heart disease: < 40 mg/dL  Negative risk factor for heart disease: ?? 60 mg/dL   Trig 199 mg/dL (High) 12/16/2020 04:10 CST    .00 mg/dL 12/16/2020 04:10 CST    Diagnostic Results  (12/15/2020 14:48 CST CT Head W/O Contrast)  1. Left frontoparietal acute intraparenchymal hemorrhage encompasses an approximately 5.8 x 7.4 x 5 cm area (SAT; coronal image 45; series 3 image 14), previously 5.8 x 6.7 x 4.9 cm, respectively. Mass effect results in 0.5 cm left-to-right subfalcine shift, previously 0.3 cm.  2. Layering intraventricular blood in the right occipital horn is new (series 3 image 20). The left lateral and third ventricles are minimally more effaced on the current exam. No hydrocephalus.  3. No CT evidence of an acute territorial infarct. [1]  ?  (12/16/2020 04:50 CST CT Head W/O Contrast)  There is similar size of large parenchymal hemorrhage centered in the left frontoparietal lobes with surrounding edema. Small amount of hemorrhage is again seen layering in the right occipital horn. There is no definite new or progressive intracranial hemorrhage.  Mass effect is similar with partial effacement of the left lateral ventricle and approximately 5 mm of rightward midline shift. The basal cisterns are patent. The calvarium and skull base are intact. [2]     [1]?CT Head W/O Contrast; Nya SPENCER, Pj Lima  12/15/2020 14:48 CST  [2]?CT Head W/O Contrast; Yessica Coughlin MD 12/16/2020 04:50 CST   For this patient encounter, I reviewed the NP or PA documentation, treatment plan, and medical decision making; and I had face-to-face time with this patient. ?Labs and imaging were reviewed and I agree with history, physical and medical decision making as detailed above.??  Spoke with nurse at bedside.  Not operative candidate.  Pt is?lethargic but arouses and follows simple commands using the left hand.? Right HP.  EEG w/o sz.  Cont.?close monitoring.  Cont. Keppra.

## 2022-09-13 NOTE — HISTORICAL [facility] CERNER
This is a historical note converted from Lily. Formatting and pictures may have been removed.  Please reference Lily for original formatting and attached multimedia. Chief Complaint  Est patient here with Left Hip pain wanting an injection. Xrays today.  History of Present Illness  Patient comes in today complaining of?left-sided hip pain. ?She states it is more in her buttock area. ?Is been shooting down her leg into her feet more recently. ?She had a recent?evaluation by her PCP, was noted to have sciatica 2 weeks ago. ?She has been given?medication. ?She denies any groin pain she denies other complaints.? Patient has had a left?hip replacement over 20 years ago.  Physical Exam  Vitals & Measurements  T:?98.4? ?F (Oral)? HR:?100(Peripheral)? BP:?127/81?  HT:?160?cm? WT:?74.6?kg? BMI:?29.14?  Left lower extremity form soft and warm. ?Skin is intact. ?There is no signs or symptoms of DVT or infection. ?She is tender along the posterior aspect of the left hip area. ?She has no pain with logrolling the left hip she has a negative Stinchfield exam she is nontender over her?trochanteric area.? There is no obvious long track signs, she does walk with her walker?she is neurovascular intact distally.? X-rays 2 views of the left hip?demonstrate no obvious fracture dislocation,?previous hip replacement  Assessment/Plan  1.?Lumbar radiculopathy?M54.16  ?At this time we discussed her physical exam and?x-ray findings. ?I believe the majority of her pain?is coming from?her lower back.? Some of her pain could be coming from her hip though, she does have a history of a?hip replacement 20 years prior.? Though she denies any groin pain,?and main complaint today is radiculopathy.? She will follow-up with her neurosurgeon.? She will continue low impact activities. ?Patient states she will call return sooner if there is any new problems or difficulties.  Ordered:  Office/Outpatient Visit Level 4 Established [id number] PC, Lumbar  radiculopathy  History of total left hip replacement, [facility], 06/04/20 10:46:00 CDT  ?  2.?History of total left hip replacement?Z96.642  Ordered:  Office/Outpatient Visit Level 4 Established [id number] PC, Lumbar radiculopathy  History of total left hip replacement, [facility], 06/04/20 10:46:00 CDT  ?  Orders:  XR Hip Left 2 Views, Routine, 06/04/20 10:25:00 CDT, None, Wheelchair, Patient Has IV?, Patient on Oxygen?, Rad Type, Left hip pain, Not Scheduled, 06/04/20 10:25:00 CDT   Problem List/Past Medical History  Ongoing  Abnormal liver function tests(  Confirmed  )  Abscess of hand  Arthritis, rheumatoid  CAD (coronary artery disease)  Carotid artery disease  Constipation  Foreign body in hand  H/O: viral illness(  Confirmed  )  [place] - Hard of hearing  Hyperlipidemia  Hypertension, essential(  Confirmed  )  Hypothyroidism(  Confirmed  )  Lumbar spondylosis  Macular degeneration of both eyes  Ongoing use of possibly toxic medication  Osteoporosis(  Confirmed  )  Peripheral neuropathy, idiopathic  Polyp of colon  Stented coronary artery  Historical  Aortic valve insufficiency  CAD  Carotid artery stenosis  Cataracts  Dyslipidemia  Htn  Hypothyroid  Macular degeneration of both eyes  MI - Myocardial infarction  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  RA  Thyroid disease  Procedure/Surgical History  Catheter placement in coronary artery(s) for coronary angiography, including intraprocedural injection(s) for coronary angiography, imaging supervision and interpretation; with left heart catheterization including intraprocedural injection(s) for left jackson (03/16/2020)  Dilation of Coronary Artery, One Artery with Two Drug-eluting Intraluminal Devices, Percutaneous Approach (03/16/2020)  Endoluminal imaging of coronary vessel or graft using intravascular ultrasound (IVUS) or optical coherence tomography (OCT) during diagnostic evaluation and/or therapeutic intervention including imaging supervision,  interpretation and report; initial vess (03/16/2020)  Fluoroscopy of Left Heart using Low Osmolar Contrast (03/16/2020)  Fluoroscopy of Multiple Coronary Arteries using Low Osmolar Contrast (03/16/2020)  Measurement of Cardiac Sampling and Pressure, Left Heart, Percutaneous Approach (03/16/2020)  Percutaneous transcatheter placement of drug eluting intracoronary stent(s), with coronary angioplasty when performed; single major coronary artery or branch (03/16/2020)  Ultrasonography of Single Coronary Artery, Intravascular (03/16/2020)  Catheter placement in coronary artery(s) for coronary angiography, including intraprocedural injection(s) for coronary angiography, imaging supervision and interpretation; with left heart catheterization including intraprocedural injection(s) for left jackson (08/15/2017)  Fluoroscopy of Abdominal Aorta using Low Osmolar Contrast (08/15/2017)  Fluoroscopy of Left Heart using Low Osmolar Contrast (08/15/2017)  Fluoroscopy of Multiple Coronary Arteries using Low Osmolar Contrast (08/15/2017)  Measurement of Cardiac Sampling and Pressure, Left Heart, Percutaneous Approach (08/15/2017)  Appendectomy (1945)  cataract surgery  colonoscopy  H/O heart artery stent  hand surgery  History of bilateral hip replacements  History of cholecystectomy  hysterectomy  mamogram  right hand  right wrist fusion   Medications  aspirin 81 mg oral Delayed Release (EC) tablet, 81 mg= 1 tab(s), Oral, Daily, 4 refills  Calcium 600+D, Oral, At Bedtime  diclofenac sodium 50 mg oral delayed release tablet, 50 mg= 1 tab(s), Oral, TID  Fish Oil oral capsule, 1 cap(s), Oral, Daily  folic acid 1 mg oral tablet, 1 mg= 1 tab(s), Oral, Daily, 3 refills  gabapentin 300 mg oral capsule, 300 mg= 1 cap(s), Oral, TID, 1 refills  Humira 40 mg/0.8 mL subcutaneous kit, 40 mg= 0.8 mL, Subcutaneous, q2wk  isosorbide MONOnitrate 30 mg oral tablet, Extended Release, 30 mg= 1 tab(s), Oral, Daily  levothyroxine 88 mcg (0.088 mg) oral  tablet, See Instructions  lutein 20 mg oral tablet, 20 mg= 1 tab(s), Oral, Daily  methotrexate 2.5 mg oral tablet  metoprolol TARTrate 50 mg oral tablet (for Lopressor), 50 mg= 1 tab(s), Oral, BID  nitroglycerin 0.4 mg sublingual TAB, 0.4 mg= 1 tab(s), SL, q5min, PRN, 1 refills  Pantoprazole 40 mg ORAL EC-Tablet, 40 mg= 1 tab(s), Oral, Daily, 1 refills  Plavix 75 mg oral tablet, 75 mg= 1 tab(s), Oral, Daily, 4 refills  prednisONE 5 mg oral tablet, 5 mg= 1 tab(s), Oral, Daily  rosuvastatin 20 mg oral tablet, 20 mg= 1 tab(s), Oral, qPM  Thera-M oral tablet, Oral, Daily  traMADol 50 mg oral tablet, 50 mg= 1 tab(s), Oral, q4hr, PRN  Vitamin C 1000 mg oral tablet, 1000 mg= 1 tab(s), Oral, Daily  Zyrtec 10 mg oral tablet, 10 mg= 1 tab(s), Oral, Daily  Allergies  Darvocet A500?(upset stomach)  Mobic?(upset stomach)  Norco?(upset stomach)  Percocet 10/325?(upset stomach)  Percodan?(upset stomach)  Robaxin?(upset stomach)  oxyCODONE?(upset stomach)  zolpidem?(amnesia)  Social History  Abuse/Neglect  No, 06/04/2020  Alcohol - Denies Alcohol Use, 12/03/2013  Never, Wine, 12/14/2019  Current, 1-2 times per month, 08/04/2015  Employment/School  Retired, 08/04/2015  Exercise  Exercise duration: 0., 08/04/2015  Home/Environment  Lives with Alone., 08/04/2015  Nutrition/Health  Regular, 08/04/2015  Sexual  Sexually active: No., 08/04/2016  Substance Use - Denies Substance Abuse, 12/03/2013  Never, 12/14/2019  Never, 08/04/2015  Tobacco - Denies Tobacco Use, 12/03/2013  Never (less than 100 in lifetime), No, 06/04/2020  Family History  Acute myocardial infarction: Negative: Mother.  Cancer: Father.  Cancer: Sister.  Heart attack.: Brother.  Primary malignant neoplasm of lung: Brother.Negative: Father and Sister.  Stroke: Sister.  Immunizations  Vaccine Date Status Comments   influenza virus vaccine, inactivated 10/10/2019 Recorded    tetanus/diphtheria/pertussis, acel(Tdap) 10/06/2019 Given    measles/mumps/rubella virus vaccine  05/03/2019 Recorded    influenza virus vaccine, inactivated 10/16/2018 Recorded    pneumococcal 13-valent conjugate vaccine 01/16/2018 Given    influenza virus vaccine, inactivated 10/2017 Recorded [1/16/2018] per patient is october   influenza virus vaccine, inactivated 10/18/2016 Given    influenza virus vaccine, inactivated 10/30/2015 Recorded    tetanus toxoid 05/28/2015 Recorded [8/4/2015] 0.5ML AT [facility]   zoster vaccine live 05/28/2015 Recorded [8/4/2015] GIVEN AT [facility] STATE   influenza virus vaccine, inactivated 10/07/2014 Recorded    pneumococcal 23-polyvalent vaccine 06/15/2005 Recorded    Health Maintenance  Health Maintenance  ???Pending?(in the next year)  ??? ??OverDue  ??? ? ? ?Coronary Artery Disease Maintenance-Antiplatelet Agent Prescribed due??and every?  ??? ? ? ?Coronary Artery Disease Maintenance-Lipid Lowering Therapy due??and every?  ??? ? ? ?Pneumococcal Vaccine due??and every?  ??? ??Due?  ??? ? ? ?Medicare Annual Wellness Exam due??06/04/20??and every 1??year(s)  ??? ??Due In Future?  ??? ? ? ?Hypertension Management-Education not due until??10/27/20??and every 6??month(s)  ??? ? ? ?Advance Directive not due until??01/01/21??and every 1??year(s)  ??? ? ? ?Cognitive Screening not due until??01/01/21??and every 1??year(s)  ??? ? ? ?Fall Risk Assessment not due until??01/01/21??and every 1??year(s)  ??? ? ? ?Functional Assessment not due until??01/01/21??and every 1??year(s)  ??? ? ? ?Obesity Screening not due until??01/01/21??and every 1??year(s)  ??? ? ? ?Hypertension Management-BMP not due until??03/19/21??and every 1??year(s)  ??? ? ? ?ADL Screening not due until??04/27/21??and every 1??year(s)  ??? ? ? ?Hypertension Management-Blood Pressure not due until??05/19/21??and every 1??year(s)  ???Satisfied?(in the past 1 year)  ??? ??Satisfied?  ??? ? ? ?ADL Screening on??04/27/20.??Satisfied by Ashley Ibarra LPN  ??? ? ? ?Advance Directive  on??04/27/20.??Satisfied by Ashley Ibarra LPN  ??? ? ? ?Blood Pressure Screening on??06/04/20.??Satisfied by Elana Champion  ??? ? ? ?Body Mass Index Check on??06/04/20.??Satisfied by Elana Champion.  ??? ? ? ?Cognitive Screening on??04/27/20.??Satisfied by Ashley Ibarra LPN  ??? ? ? ?Coronary Artery Disease Maintenance-Lipid Lowering Therapy on??04/27/20.  ??? ? ? ?Depression Screening on??05/19/20.??Satisfied by Ashley Ibarra LPN  ??? ? ? ?Diabetes Screening on??03/19/20.??Satisfied by Anjali Smith  ??? ? ? ?Fall Risk Assessment on??04/27/20.??Satisfied by Ashley Ibarra LPN  ??? ? ? ?Functional Assessment on??03/16/20.??Satisfied by Kiera Macias RN  ??? ? ? ?Hypertension Management-Blood Pressure on??06/04/20.??Satisfied by Elana Champion  ??? ? ? ?Influenza Vaccine on??10/10/19.??Satisfied by Ashley Ibarra LPN  ??? ? ? ?Lipid Screening on??03/17/20.??Satisfied by Zoe Mayorga  ??? ? ? ?Obesity Screening on??06/04/20.??Satisfied by Elana Champion  ??? ? ? ?Tetanus Vaccine on??10/06/19.??Satisfied by Klever RUANO, Skylar AVILES  ?

## 2022-09-13 NOTE — HISTORICAL [facility] CERNER
This is a historical note converted from Lily. Formatting and pictures may have been removed.  Please reference Lily for original formatting and attached multimedia. Chief Complaint  Pt. began with dizziness, weakness, visual changes and R arm tremors at 1930. All symptoms now resolved. R arm weakness noted - pt. states this has been present x 2 weeks.  in triage.  Reason for Consultation  CVA, possible focal seizure  History of Present Illness  This is an 86-year-old female with PMH of HTN, HLD, CAD/MI/stent, YOUNG, RA, OA, lumbar spondylosis, and hypothyroidism who presented to ED for intermittent dizziness, weakness, RUE weakness, and jumping of right arm and leg.? She had a similar episode approximately 2 weeks ago, but symptoms resolved.? CT head on arrival showed new small area of hypoattenuation anterior left parietal lobe subcortical white matter.? MRI brain showed left high frontal lobe subacute infarcts.? Frontal lobe posterior infarcts containing old hemorrhagic byproducts with recommended follow-up MRI brain in 6 weeks.? Neurology has been consulted for stroke work-up?and possible focal seizure.  ?  Lying in bed, NAD,?VSS.? Patient provides?HPI?of?symptoms consistent with those above. ?She reports the jumping?of her?leg is?the motion of kicking and?the?jumping of her arm is actually in her hand. ?She reports persistent?right arm and leg weakness. ?She also endorses some difficulty with?expressive aphasia. ?She denies any?associated LOC,?tongue biting, loss of bowel or bladder control. ?Patient is on Plavix and rosuvastatin at home.? She reports compliance.? Not a smoker. ?Exam reveals right hemiparesis.  Review of Systems  You to 14 point review of systems which is negative unless otherwise stated in HPI.  Physical Exam  Vitals & Measurements  T:?36.6? ?C (Oral)? TMIN:?36.3? ?C (Temporal Artery)? TMAX:?36.7? ?C (Oral)? HR:?64(Peripheral)? RR:?18? BP:?144/68? SpO2:?97%? WT:?70.54?kg?  BMI:?27.49?  General:  Alert and oriented  NAD  No overt edema  ?   Cognition and Comprehension:  Speech and language intact  Follows commands  speech fluent  Attention intact  Memory for recent events intact from history taking  Affect pleasant  Fund of knowledge adequate  ?   Cranial nerves:?  CN 2_ Visual?fields (full to confrontation both eyes)  CN 3, 4, 6_ Intact, STEVIE, no nystagmus  CN 5_facial tactile sensation intact  CN 7_no face asymmetry; normal eye closure and smile  CN 8_hearing intact to spoken voice  CN 9, 10, 11_voice normal, shoulder shrug ok; deltoids not fatigable?  CN 12 tongue_slight deviation to right  ?   Muscle Strength and Tone:  Normal upper extremity tone  Normal lower extremity tone  Right upper extremity drift?and weakness  Right lower extremity drift?and proximal weakness  ?   Sensation:  Intact to light touch and temperature  ?   Coordination and Gait:  Ataxia with FTN?on the right  Assessment/Plan  Orders:  CT Angio Head W W/O Contrast, Routine, 11/05/20 11:14:00 CST, Other (please specify), cva, None, Wheelchair, Patient Has IV?, Patient on Oxygen?, Creatinine if needed per protocol, Rad Type, 11/05/20 11:14:00 CST  CT Angio Neck W W/O Contrast, Routine, 11/05/20 11:14:00 CST, Other (please specify), cva, None, Wheelchair, Patient Has IV?, Patient on Oxygen?, Creatinine if needed per protocol, Rad Type, 11/05/20 11:14:00 CST  Impression/Plan:  1.? Left frontal subacute stroke  2.? HLD  3.? HTN  4.? CAD  5.? ? Focal seizure  ?  Stroke workup:  -CT head new small area of hypoattenuation anterior left parietal lobe subcortical white matter.?  -MRI brain showed left high frontal lobe subacute infarcts. Frontal lobe posterior infarcts containing old hemorrhagic byproducts with recommended follow-up MRI brain in 6 weeks.  -Carotid?US showed less than 50% stenosis bilaterally, patent bilateral vertebrals, mild amount of soft plaque right proximal ICA and mild amount of hard plaque in  proximal left ICA.  -CTA h/n pending  -TTE pending  -LDL 46  -A1C 5.5  -On plavix at home.? ASA 81 mg has been started inpatient.? May?need short-term DAPT, but will defer to Dr. Pryor  -Continue home rosuvastatin  ?  -Myoclonic activity from history seems consistent with seizure activity.? EEG pending.? Consider starting low dose keppra given remote infarct could be seizure focus.? Defer to MD  ?  Additional recs to follow per MD   Problem List/Past Medical History  Ongoing  Abnormal liver function tests(  Confirmed  )  Abscess of hand  Arthritis, rheumatoid  CAD (coronary artery disease)  Carotid artery disease  Constipation  Foreign body in hand  H/O: viral illness(  Confirmed  )  [place] - Hard of hearing  Hyperlipidemia  Hypertension, essential(  Confirmed  )  Hypothyroidism(  Confirmed  )  Lumbar spondylosis  Macular degeneration of both eyes  Ongoing use of possibly toxic medication  Osteoporosis(  Confirmed  )  Peripheral neuropathy, idiopathic  Polyp of colon  Stented coronary artery  Historical  Aortic valve insufficiency  CAD  Carotid artery stenosis  Cataracts  Dyslipidemia  Htn  Hypothyroid  Macular degeneration of both eyes  MI - Myocardial infarction  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  RA  Thyroid disease  Procedure/Surgical History  Catheter placement in coronary artery(s) for coronary angiography, including intraprocedural injection(s) for coronary angiography, imaging supervision and interpretation; with left heart catheterization including intraprocedural injection(s) for left jackson (08/03/2020)  Fluoroscopy of Left Heart using Low Osmolar Contrast (08/03/2020)  Fluoroscopy of Multiple Coronary Arteries using Low Osmolar Contrast (08/03/2020)  Measurement of Cardiac Sampling and Pressure, Left Heart, Percutaneous Approach (08/03/2020)  Catheter placement in coronary artery(s) for coronary angiography, including intraprocedural injection(s) for coronary angiography, imaging supervision and  interpretation; with left heart catheterization including intraprocedural injection(s) for left jackson (03/16/2020)  Dilation of Coronary Artery, One Artery with Two Drug-eluting Intraluminal Devices, Percutaneous Approach (03/16/2020)  Endoluminal imaging of coronary vessel or graft using intravascular ultrasound (IVUS) or optical coherence tomography (OCT) during diagnostic evaluation and/or therapeutic intervention including imaging supervision, interpretation and report; initial vess (03/16/2020)  Fluoroscopy of Left Heart using Low Osmolar Contrast (03/16/2020)  Fluoroscopy of Multiple Coronary Arteries using Low Osmolar Contrast (03/16/2020)  Measurement of Cardiac Sampling and Pressure, Left Heart, Percutaneous Approach (03/16/2020)  Percutaneous transcatheter placement of drug eluting intracoronary stent(s), with coronary angioplasty when performed; single major coronary artery or branch (03/16/2020)  Ultrasonography of Single Coronary Artery, Intravascular (03/16/2020)  Catheter placement in coronary artery(s) for coronary angiography, including intraprocedural injection(s) for coronary angiography, imaging supervision and interpretation; with left heart catheterization including intraprocedural injection(s) for left jackson (08/15/2017)  Fluoroscopy of Abdominal Aorta using Low Osmolar Contrast (08/15/2017)  Fluoroscopy of Left Heart using Low Osmolar Contrast (08/15/2017)  Fluoroscopy of Multiple Coronary Arteries using Low Osmolar Contrast (08/15/2017)  Measurement of Cardiac Sampling and Pressure, Left Heart, Percutaneous Approach (08/15/2017)  Appendectomy (1945)  cataract surgery  colonoscopy  H/O heart artery stent  hand surgery  History of bilateral hip replacements  History of cholecystectomy  hysterectomy  mamogram  right hand  right wrist fusion   Medications  Inpatient  aspirin 81 mg oral Delayed Release (EC) tablet, 81 mg= 1 tab(s), Oral, Daily  enoxaparin, 30 mg= 0.3 mL, Subcutaneous,  Daily  hydrALAZINE 20 mg/mL injectable solution, 10 mg= 0.5 mL, IV Push, q4hr, PRN  labetalol, 10 mg= 2 mL, IV Push, q10min, PRN  Sodium Chloride 0.9% intravenous solution 1,000 mL, 1000 mL, IV  Home  acetaminophen-codeine 300 mg-30 mg oral tablet., 1 tab(s), Oral, q6hr  amLODIPine 2.5 mg oral tablet, 2.5 mg= 1 tab(s), Oral, Daily  aspirin 81 mg oral Delayed Release (EC) tablet, 81 mg= 1 tab(s), Oral, Daily, 4 refills  Calcium 600+D, Oral, At Bedtime  Fish Oil oral capsule, 1 cap(s), Oral, Daily  folic acid 1 mg oral tablet, 1 mg= 1 tab(s), Oral, Daily, 3 refills  gabapentin 300 mg oral capsule, 300 mg= 1 cap(s), Oral, TID, 1 refills  Humira 40 mg/0.8 mL subcutaneous kit, 40 mg= 0.8 mL, Subcutaneous, q2wk  isosorbide MONOnitrate 60 mg oral tablet, Extended Release, 60 mg= 1 tab(s), Oral, Daily  levothyroxine 88 mcg (0.088 mg) oral tablet, 88 mcg= 1 tab(s), Oral, Daily  lutein 20 mg oral tablet, 20 mg= 1 tab(s), Oral, Daily  methotrexate 2.5 mg oral tablet  metoprolol succinate 25 mg oral capsule, extended release, 25 mg= 1 cap(s), Oral, BID  metoprolol tartrate 50 mg oral tab, 50 mg= 1 tab(s), Oral, BID  nitroglycerin 0.4 mg sublingual TAB, 0.4 mg= 1 tab(s), SL, q5min, PRN  Pantoprazole 40 mg ORAL EC-Tablet, 40 mg= 1 tab(s), Oral, Daily, 1 refills  Plavix 75 mg oral tablet, 75 mg= 1 tab(s), Oral, Daily, 4 refills  prednisONE 5 mg oral tablet, 5 mg= 1 tab(s), Oral, Daily  rosuvastatin 20 mg oral tablet, 20 mg= 1 tab(s), Oral, qPM  Thera-M oral tablet, Oral, Daily  traMADol 50 mg oral tablet, 50 mg= 1 tab(s), Oral, q4hr, PRN  Vitamin C 1000 mg oral tablet, 1000 mg= 1 tab(s), Oral, Daily  Zyrtec 10 mg oral tablet, 10 mg= 1 tab(s), Oral, Daily  Allergies  Darvocet A500?(upset stomach)  Mobic?(upset stomach)  Norco?(upset stomach)  Percocet 10/325?(upset stomach)  Percodan?(upset stomach)  Robaxin?(upset stomach)  oxyCODONE?(upset stomach)  zolpidem?(amnesia)  Social History  Abuse/Neglect  No, No, Yes, 11/05/2020  No,  No, Yes, 11/04/2020  No, 08/03/2020  Alcohol - Denies Alcohol Use, 12/03/2013  Past, 07/17/2020  Never, Wine, 12/14/2019  Current, 1-2 times per month, 08/04/2015  Employment/School  Retired, 08/04/2015  Exercise  Exercise duration: 0., 08/04/2015  Home/Environment  Lives with Alone., 08/04/2015  Nutrition/Health  Regular, 08/04/2015  Sexual  Sexually active: No., 08/04/2016  Substance Use - Denies Substance Abuse, 12/03/2013  Never, 06/15/2020  Never, 08/04/2015  Tobacco - Denies Tobacco Use, 12/03/2013  Never (less than 100 in lifetime), No, 11/05/2020  Family History  Acute myocardial infarction: Negative: Mother.  Cancer: Father.  Cancer: Sister.  Heart attack.: Brother.  Primary malignant neoplasm of lung: Brother.Negative: Father and Sister.  Stroke: Sister.  Immunizations  Vaccine Date Status Comments   influenza virus vaccine, inactivated 10/06/2020 Given    influenza virus vaccine, inactivated 10/10/2019 Recorded    tetanus/diphtheria/pertussis, acel(Tdap) 10/06/2019 Given    measles/mumps/rubella virus vaccine 05/03/2019 Recorded    influenza virus vaccine, inactivated 10/16/2018 Recorded    pneumococcal 13-valent conjugate vaccine 01/16/2018 Given    influenza virus vaccine, inactivated 10/2017 Recorded [1/16/2018] per patient is october   influenza virus vaccine, inactivated 10/18/2016 Given    influenza virus vaccine, inactivated 10/30/2015 Recorded    tetanus toxoid 05/28/2015 Recorded [8/4/2015] 0.5ML AT [facility]   zoster vaccine live 05/28/2015 Recorded [8/4/2015] GIVEN AT [facility] OF STATE   influenza virus vaccine, inactivated 10/07/2014 Recorded    pneumococcal 23-polyvalent vaccine 06/15/2005 Recorded    Lab Results  Test Name Test Result Date/Time Comments   Sodium Lvl 143 mmol/L 11/04/2020 23:49 CST    Potassium Lvl 3.9 mmol/L 11/04/2020 23:49 CST    Chloride 104 mmol/L 11/04/2020 23:49 CST    CO2 30 mmol/L 11/04/2020 23:49 CST    Calcium Lvl 9.6 mg/dL 11/04/2020 23:49  CST    BUN 20.0 mg/dL 11/04/2020 23:49 CST    Creatinine 1.11 mg/dL (High) 11/04/2020 23:49 CST    eGFR-AA 60 11/04/2020 23:49 CST    eGFR-JANIE 50 mL/min/1.73 m2 11/04/2020 23:49 CST    Bili Total 0.5 mg/dL 11/04/2020 23:49 CST IOM   Bili Direct 0.2 mg/dL 11/04/2020 23:49 CST    Bili Indirect 0.30 mg/dL 11/04/2020 23:49 CST    AST 24 unit/L 11/04/2020 23:49 CST    ALT 23 unit/L 11/04/2020 23:49 CST    Alk Phos 103 unit/L 11/04/2020 23:49 CST    Hgb A1c 5.5 % 11/04/2020 23:49 CST    LDL 46.00 mg/dL (Low) 11/04/2020 23:49 CST    Total CK 62 U/L 11/04/2020 23:49 CST    Troponin-I 0.039 ng/mL 11/04/2020 23:49 CST 0.5 ng/mL is the accepted discriminant level for myocardial injury rather than a statistical normal limit for the general population.   PT 13.5 second(s) 11/04/2020 23:49 CST    INR 1.0 11/04/2020 23:49 CST    PTT 26.5 second(s) 11/04/2020 23:49 CST    WBC 7.6 x10(3)/mcL 11/04/2020 23:49 CST    Hgb 11.4 gm/dL (Low) 11/04/2020 23:49 CST    Hct 36.8 % (Low) 11/04/2020 23:49 CST    Platelet 240 x10(3)/mcL 11/04/2020 23:49 CST    Sed Rate 8 mm/hr 11/04/2020 23:49 CST    UA Appear CLEAR 11/04/2020 22:52 CST    UA Color YELLOW 11/04/2020 22:52 CST    UA Spec Grav 1.010 11/04/2020 22:52 CST    UA Bili Negative UA 11/04/2020 22:52 CST    UA pH 7.5 11/04/2020 22:52 CST    UA Urobilinogen 0.2 11/04/2020 22:52 CST    UA Blood Negative UA 11/04/2020 22:52 CST    UA Glucose Negative UA 11/04/2020 22:52 CST    UA Ketones Negative UA 11/04/2020 22:52 CST    UA Protein Negative UA 11/04/2020 22:52 CST    UA Nitrite Negative UA 11/04/2020 22:52 CST    UA Leuk Est Negative UA 11/04/2020 22:52 CST    UA WBC NONE SEEN 11/04/2020 22:52 CST    UA RBC NONE SEEN 11/04/2020 22:52 CST    UA Bacteria NONE SEEN 11/04/2020 22:52 CST    UA Squam Epithelial NONE SEEN 11/04/2020 22:52 CST    U Amph Scr Negative 11/04/2020 22:52 CST    U Mitzy Scr Negative 11/04/2020 22:52 CST    U Benzodia Scr Negative 11/04/2020 22:52 CST    U Cannab Scr  Negative 11/04/2020 22:52 CST    U Cocaine Scr Negative 11/04/2020 22:52 CST    U Opiate Scr Negative 11/04/2020 22:52 CST    U Phencyclidine Scr Negative 11/04/2020 22:52 CST    Diagnostic Results  (11/04/2020 21:37 CST CT Head W/O Contrast)  FINDINGS: There is no acute intracranial hemorrhage, midline shift,  mass effect, or extra axial collection. Intracranial vascular  calcifications noted.?  The ventricles and sulci are diffusely proportionately prominent  compatible with mild/moderate involutional changes. There are moderate  patchy areas of decreased attenuation in the periventricular and  subcortical white matter, while nonspecific, most commonly sequelea of  chronic microvascular ischemia. There is a new area of hypoattenuation  in the anterior left parietal subcortical white matter. There is  stable calcified extra-axial lesion adjacent to the anterior left  upper lobe.  ?  IMPRESSION:?  ?  No acute intracranial hemorrhage.  ?  Chronic changes as above.  ?  New small area of hypoattenuation the anterior left parietal lobe  subcortical white matter. Consider MRI correlation.  ?   (11/05/2020 02:02 CST MRI Brain W/O Contrast)  FINDINGS: Left high frontal lobe is remarkable for heterogeneous T2  FLAIR hyperintense signal with subtle diffusion-weighted restriction  without signal dropout on ADC map. This suggests a subacute infarct  though follow-up exams in 6 weeks with contrast is recommended to  exclude other causes. There is also left posterior high frontal lobe  subacute infarct with diffusion-weighted restriction without signal  dropout on ADC map. This infarct contains dephasing artifacts on the  gradient echo sequence consistent with nonacute hemorrhagic  byproducts. There are prominent chronic microvascular ischemic changes  with periventricular and deep white matter T2 FLAIR hyperintense  signals which show interval progression. There is generalized cerebral  and cerebellar cortical volume loss.  There is partially empty sella.  ?  The cerebellar tonsils are normally positioned. There is no acute  intracranial hemorrhage, hydrocephalus, midline shift or mass effect.?  No acute extra axial fluid collections identified. The mastoid air  cells are clear. ?  ?  IMPRESSION:  ?  1. Left high frontal lobe subacute infarcts. Frontal lobe posterior  infarct contains old hemorrhagic byproducts. Follow-up MRI brain in 6  weeks is recommended.?  2. Chronic microangiopathic ischemia and atrophy.     [1]?MRI Brain W/O Contrast; Kendall Lehman MD 11/05/2020 02:02 CST   I have performed a history and physical examination of the patient and discussed the management with the nurse practitioner.  ?   Acute?over subacute?onset of RHP and jerking.?  ?   MRI brain shows left MCA infarcts  ?   AAOx4  Fluent  Mild R facial droop  RHP  ?  Add Keppra  CTA head and neck  ASA/Plavix for now  ?  [ x] I reviewed the nurse practitioners note and agree with the documented findings and plan of care.  [ ] I reviewed the nurse practitioners note and agree with the documented findings and plan of care except:  ?

## 2022-09-13 NOTE — HISTORICAL [facility] CERNER
This is a historical note converted from Lily. Formatting and pictures may have been removed.  Please reference Lily for original formatting and attached multimedia. 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  Immunizations  No immunizations recorded for this visit.  Admission Information  Patient is a 86-year-old female who was brought to the emergency room and 12/15/2020 after being found unresponsive with workup showing a large left frontal parietal and basal ganglia parenchymal hemorrhage with surrounding edema, for which neurosurgery was consulted and recommended no surgical intervention, ultimately patient was made DNR and comfort care only.? Discussed plan of care with daughter at bedside, who agrees with comfort care measures.? She is downgraded from the ICU for continued care and for placement with hospice.  -Patients family opted for home hospice.  Significant Findings  Labs Last 24 Hours?  ?Chemistry?   Sodium Lvl:?149 mmol/L?High (12/17/20 14:44:00)   Potassium Lvl: 3.6 mmol/L (12/17/20 14:44:00)   Chloride:?119 mmol/L?High (12/17/20 14:44:00)   CO2:?21 mmol/L?Low (12/17/20 14:44:00)   Calcium Lvl:?8.2 mg/dL?Low (12/17/20 14:44:00)   Glucose Lvl:?138 mg/dL?High (12/17/20 14:44:00)   BUN:?33.4 mg/dL?High (12/17/20 14:44:00)   Creatinine: 0.79 mg/dL (12/17/20  14:44:00)   BUN/Creat Ratio: 42 (12/17/20 14:44:00)   eGFR-AA: >60 (12/17/20 14:44:00)   eGFR-JANIE: >60 (12/17/20 14:44:00)   Bili Total: 1.1 mg/dL (12/17/20 03:52:00)   Bili Direct: 0.5 mg/dL (12/17/20 03:52:00)   Bili Indirect: 0.6 mg/dL (12/17/20 03:52:00)   AST:?114 unit/L?High (12/17/20 03:52:00)   ALT: 31 unit/L (12/17/20 03:52:00)   Alk Phos: 65 unit/L (12/17/20 03:52:00)   Total Protein: 6.4 gm/dL (12/17/20 03:52:00)   Albumin Lvl:?3.3 gm/dL?Low (12/17/20 03:52:00)   Globulin: 3.1 gm/dL (12/17/20 03:52:00)   A/G Ratio: 1.1 ratio (12/17/20 03:52:00)   Time Spent on discharge  > 30 mins  Objective  Vitals & Measurements  T:?37? ?C (Oral)? TMIN:?36.4? ?C (Oral)? TMAX:?37.5? ?C (Oral)? HR:?73(Peripheral)? RR:?20? BP:?143/78? SpO2:?99%?  Physical Exam  GENERAL: Obtunded  HEENT: normocephalic atraumatic?  NECK: supple?  LUNGS:COURSE  CVS: Regular rate and rhythm, normal peripheral perfusion  ABD: Soft, non-tender, non-distended, bowel sounds present  EXTREMITIES: no clubbing or cyanosis  SKIN: Warm, dry.?  NEURO: Unable to assess  PSYCHIATRIC: Unable to assess  Patient Discharge Condition  guarded, poor  Discharge Disposition  home hospice   Discharge Medication Reconciliation  Continue  amLODIPine (amLODIPine 2.5 mg oral tablet)?2.5 mg, Oral, Daily  carvedilol (Coreg 12.5 mg oral tablet)?12.5 mg, Oral, BIDWMeal  levETIRAcetam (Keppra 500 mg oral tablet)?1,000 mg, Oral, BID  Discontinue  acetaminophen-codeine (acetaminophen-codeine 300 mg-30 mg oral tablet)?1 tab(s), Oral, q6hr, PRN as needed for pain  adalimumab (Humira 40 mg/0.8 mL subcutaneous kit)?40 mg, Subcutaneous, q2wk  adalimumab (Humira Pre-Filled Syringe 40 mg/0.4 mL subcutaneous kit)  ascorbic acid (Vitamin C 1000 mg oral tablet)?1,000 mg, Oral, Daily  aspirin (aspirin 81 mg oral Delayed Release (EC) tablet)?81 mg, Oral, Daily  calcium-vitamin D (Calcium 600+D), Oral, At Bedtime  cetirizine (Zyrtec 10 mg oral tablet)?10 mg, Oral, Daily  clopidogrel  (Plavix 75 mg oral tablet)?75 mg, Oral, Daily  folic acid (folic acid 1 mg oral tablet)?1 mg, Oral, Daily  gabapentin (gabapentin 300 mg oral capsule)?300 mg, Oral, TID  isosorbide mononitrate (isosorbide MONOnitrate 60 mg oral tablet, Extended Release)?60 mg, Oral, Daily  levothyroxine (levothyroxine 88 mcg (0.088 mg) oral tablet)?88 mcg, Oral, Daily  lutein (lutein 20 mg oral tablet)?20 mg, Oral, Daily  methotrexate (methotrexate 2.5 mg oral tablet)  metoprolol (metoprolol tartrate 50 mg oral tab)?50 mg, Oral, BID  multivitamin with minerals (Thera-M oral tablet), Oral, Daily  nitroglycerin (nitroglycerin 0.4 mg sublingual TAB)?0.4 mg, SL, q5min, PRN as needed for chest pain  omega-3 polyunsaturated fatty acids (Fish Oil oral capsule)?1 cap(s), Oral, Daily  pantoprazole (Pantoprazole 40 mg ORAL EC-Tablet)?40 mg, Oral, Daily  predniSONE (prednisONE 5 mg oral tablet)?5 mg, Oral, Daily  rosuvastatin (rosuvastatin 20 mg oral tablet)?20 mg, Oral, qPM  traMADol (traMADol 50 mg oral tablet)?50 mg, Oral, q6hr  Education and Orders Provided  Preventing Pressure Injuries  Hospice  Discharge - 12/17/20 15:06:00 CST, Hospice Home?  Follow up  ATTENTION: Discharging Nurse: If patient is discharged back home, please notify her current HH: [facility]  at [phone] at time of discharge.  [facility] ?# [phone]  [employer]  No Qualifying Data

## 2022-09-13 NOTE — HISTORICAL [facility] C[facility]ER
This is a historical note converted from Cernel. Formatting and pictures may have been removed.  Please reference Cernel for original formatting and attached multimedia. Admit and Discharge Dates  Admit Date: 12/15/2020  Discharge Date: 12/18/2020  Physicians  Attending Physician - Garry SPENCER, Paul ABAD  Admitting Physician - Garry SPENCER, Paul ABAD  Consulting Physician - Bayron SPENCER, James  Primary Care Physician - Diego MEREDITH, Sharon  Discharge Diagnosis  1.?Intracranial hemorrhage?I62.9  2.?Hospice care?Z51.5  Long term (current) use of antithrombotics/antiplatelets?Z79.02  Seizure?R56.9  Seizure reevaluation?[id number]  Surgical Procedures  No procedures recorded for this visit.  Immunizations  No immunizations recorded for this visit.  Admission Information  unresponsive  Hospital Course  Patient is a 86-year-old female who was brought to the emergency room and 12/15/2020 after being found unresponsive with workup showing a large left frontal parietal and basal ganglia parenchymal hemorrhage with surrounding edema, for which neurosurgery was consulted and recommended no surgical intervention, ultimately patient was made DNR and comfort care only.? Discussed plan of care with daughter at bedside, who agreed with comfort care measures.? She?was downgraded from the ICU for continued care and for placement with hospice.  Family members choose to take patient home with hospice. ?All the equipment etc. was delivered by?[facility]?and discharge?was held until this morning.? At the time of my visit patients daughter is at bedside.? They are eager to get home?so other family members could be with the patient.? Patient does appear to have some respiratory distress?however hospice should be able to take care of this.? Provided emotional support to the patients daughter?and all of her questions were addressed and answered  Significant Findings  Labs Last 24 Hours?  ?Chemistry?   Sodium Lvl:?148  mmol/L?High (12/18/20 04:02:00)   Potassium Lvl:?3.3 mmol/L?Low (12/18/20 04:02:00)   Chloride:?117 mmol/L?High (12/18/20 04:02:00)   CO2: 25 mmol/L (12/18/20 04:02:00)   Calcium Lvl: 8.5 mg/dL (12/18/20 04:02:00)   Glucose Lvl:?121 mg/dL?High (12/18/20 04:02:00)   BUN:?35.2 mg/dL?High (12/18/20 04:02:00)   Creatinine: 0.71 mg/dL (12/18/20 04:02:00)   BUN/Creat Ratio: 47 (12/17/20 21:03:00)   eGFR-AA: >60 (12/18/20 04:02:00)   eGFR-JANIE: >60 (12/18/20 04:02:00)   Bili Total: 1 mg/dL (12/18/20 04:02:00)   Bili Direct: 0.5 mg/dL (12/18/20 04:02:00)   Bili Indirect: 0.5 mg/dL (12/18/20 04:02:00)   AST:?72 unit/L?High (12/18/20 04:02:00)   ALT: 29 unit/L (12/18/20 04:02:00)   Alk Phos: 68 unit/L (12/18/20 04:02:00)   Total Protein: 6 gm/dL (12/18/20 04:02:00)   Albumin Lvl:?2.9 gm/dL?Low (12/18/20 04:02:00)   Globulin: 3.1 gm/dL (12/18/20 04:02:00)   A/G Ratio:?0.9 ratio?Low (12/18/20 04:02:00)   Time Spent on discharge  35 minutes  Objective  Vitals & Measurements  T:?36.9? ?C (Oral)? TMIN:?36.9? ?C (Oral)? TMAX:?37.5? ?C (Oral)? HR:?78(Peripheral)? RR:?14? BP:?176/86? SpO2:?100%?  Physical Exam  General:?Obtunded  Neck: full range of motion, no thyromegaly or lymphadenopathy  Respiratory:?Scattered rales bilateral lung fields  Cardiovascular:?regular rate and rhythm without murmurs, gallops or rubs  Gastrointestinal:?soft, non-tender, non-distended with normal bowel sounds  Musculoskeletal:?Unable to assess  Integumentary: no rashes or skin lesions present  Neurologic: Obtunded, unable to arouse  ?  Patient Discharge Condition  Unstable  Discharge Disposition  Home with hospice?and family members   Discharge Medication Reconciliation  Continue  amLODIPine (amLODIPine 2.5 mg oral tablet)?2.5 mg, Oral, Daily  carvedilol (Coreg 12.5 mg oral tablet)?12.5 mg, Oral, BIDWMeal  levETIRAcetam (Keppra 500 mg oral tablet)?1,000 mg, Oral, BID  Discontinue  acetaminophen-codeine (acetaminophen-codeine 300 mg-30 mg oral tablet)?1  tab(s), Oral, q6hr, PRN as needed for pain  adalimumab (Humira 40 mg/0.8 mL subcutaneous kit)?40 mg, Subcutaneous, q2wk  adalimumab (Humira Pre-Filled Syringe 40 mg/0.4 mL subcutaneous kit)  ascorbic acid (Vitamin C 1000 mg oral tablet)?1,000 mg, Oral, Daily  aspirin (aspirin 81 mg oral Delayed Release (EC) tablet)?81 mg, Oral, Daily  calcium-vitamin D (Calcium 600+D), Oral, At Bedtime  cetirizine (Zyrtec 10 mg oral tablet)?10 mg, Oral, Daily  clopidogrel (Plavix 75 mg oral tablet)?75 mg, Oral, Daily  folic acid (folic acid 1 mg oral tablet)?1 mg, Oral, Daily  gabapentin (gabapentin 300 mg oral capsule)?300 mg, Oral, TID  isosorbide mononitrate (isosorbide MONOnitrate 60 mg oral tablet, Extended Release)?60 mg, Oral, Daily  levothyroxine (levothyroxine 88 mcg (0.088 mg) oral tablet)?88 mcg, Oral, Daily  lutein (lutein 20 mg oral tablet)?20 mg, Oral, Daily  methotrexate (methotrexate 2.5 mg oral tablet)  metoprolol (metoprolol tartrate 50 mg oral tab)?50 mg, Oral, BID  multivitamin with minerals (Thera-M oral tablet), Oral, Daily  nitroglycerin (nitroglycerin 0.4 mg sublingual TAB)?0.4 mg, SL, q5min, PRN as needed for chest pain  omega-3 polyunsaturated fatty acids (Fish Oil oral capsule)?1 cap(s), Oral, Daily  pantoprazole (Pantoprazole 40 mg ORAL EC-Tablet)?40 mg, Oral, Daily  predniSONE (prednisONE 5 mg oral tablet)?5 mg, Oral, Daily  rosuvastatin (rosuvastatin 20 mg oral tablet)?20 mg, Oral, qPM  traMADol (traMADol 50 mg oral tablet)?50 mg, Oral, q6hr  Education and Orders Provided  Preventing Pressure Injuries  Hospice  Discharge - 12/17/20 15:06:00 CST, Hospice Home?  Discharge - 12/18/20 7:40:00 CST, Hospice Home?  Follow up  ATTENTION: Discharging Nurse: If patient is discharged back home, please notify her current HH: [facility] at [phone] at time of discharge.  [facility] ?# [phone]  [employer]  No Qualifying Data

## 2022-09-13 NOTE — HISTORICAL [facility]
This is a historical note converted from Lily. Formatting and pictures may have been removed.  Please reference Lily for original formatting and attached multimedia. Chief Complaint  hx of seizures, keppra dose decreased yesterday. found on floor by family this AM. pt denies hitting head.  History of Present Illness  Patient is a 86-year-old female who was brought to the emergency room and 12/15/2020 after being found unresponsive with workup showing a large left frontal parietal and basal ganglia parenchymal hemorrhage with surrounding edema, for which neurosurgery was consulted and recommended no surgical intervention, ultimately patient was made DNR and comfort care only.? Discussed plan of care with daughter at bedside, who agrees with comfort care measures.? She is downgraded from the ICU for continued care and for placement with hospice.  Review of Systems  Unable to assess  Physical Exam  Vitals & Measurements  T:?37.3? ?C (Oral)? TMIN:?37.2? ?C (Oral)? TMAX:?37.5? ?C (Oral)? HR:?79(Peripheral)? RR:?24? BP:?168/80? SpO2:?92%?  GENERAL: Obtunded  HEENT: normocephalic atraumatic?  NECK: supple?  LUNGS:COURSE  CVS: Regular rate and rhythm, normal peripheral perfusion  ABD: Soft, non-tender, non-distended, bowel sounds present  EXTREMITIES: no clubbing or cyanosis  SKIN: Warm, dry.?  NEURO: Unable to assess  PSYCHIATRIC: Unable to assess  ?  Assessment/Plan  Intracranial hemorrhage with an anoxic brain injury  Palliative withdrawal of care and comfort care measures  Seizure disorder  History of RA, HTN,?CAD  ?  - Continue comfort care measures  - Case management consulted for hospice placement  - Discussed plan of care with daughter at bedside  ?  DNR/DNI comfort measures only   Problem List/Past Medical History  Ongoing  Abnormal liver function tests(  Confirmed  )  Abscess of hand  Arthritis, rheumatoid  CAD (coronary artery disease)  Carotid artery disease  Constipation  Foreign body in hand  H/O: viral  illness(  Confirmed  )  [place] - Hard of hearing  Hyperlipidemia  Hypertension, essential(  Confirmed  )  Hypothyroidism(  Confirmed  )  Lumbar spondylosis  Macular degeneration of both eyes  Ongoing use of possibly toxic medication  Osteoporosis(  Confirmed  )  Peripheral neuropathy, idiopathic  Polyp of colon  Stented coronary artery  Historical  Aortic valve insufficiency  CAD  Carotid artery stenosis  Cataracts  Dyslipidemia  Htn  Hypothyroid  Macular degeneration of both eyes  MI - Myocardial infarction  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  RA  Thyroid disease  Procedure/Surgical History  Catheter placement in coronary artery(s) for coronary angiography, including intraprocedural injection(s) for coronary angiography, imaging supervision and interpretation; with left heart catheterization including intraprocedural injection(s) for left jackson (08/03/2020)  Fluoroscopy of Left Heart using Low Osmolar Contrast (08/03/2020)  Fluoroscopy of Multiple Coronary Arteries using Low Osmolar Contrast (08/03/2020)  Measurement of Cardiac Sampling and Pressure, Left Heart, Percutaneous Approach (08/03/2020)  Catheter placement in coronary artery(s) for coronary angiography, including intraprocedural injection(s) for coronary angiography, imaging supervision and interpretation; with left heart catheterization including intraprocedural injection(s) for left jackson (03/16/2020)  Dilation of Coronary Artery, One Artery with Two Drug-eluting Intraluminal Devices, Percutaneous Approach (03/16/2020)  Endoluminal imaging of coronary vessel or graft using intravascular ultrasound (IVUS) or optical coherence tomography (OCT) during diagnostic evaluation and/or therapeutic intervention including imaging supervision, interpretation and report; initial vess (03/16/2020)  Fluoroscopy of Left Heart using Low Osmolar Contrast (03/16/2020)  Fluoroscopy of Multiple Coronary Arteries using Low Osmolar Contrast (03/16/2020)  Measurement of Cardiac Sampling  and Pressure, Left Heart, Percutaneous Approach (03/16/2020)  Percutaneous transcatheter placement of drug eluting intracoronary stent(s), with coronary angioplasty when performed; single major coronary artery or branch (03/16/2020)  Ultrasonography of Single Coronary Artery, Intravascular (03/16/2020)  Catheter placement in coronary artery(s) for coronary angiography, including intraprocedural injection(s) for coronary angiography, imaging supervision and interpretation; with left heart catheterization including intraprocedural injection(s) for left jackson (08/15/2017)  Fluoroscopy of Abdominal Aorta using Low Osmolar Contrast (08/15/2017)  Fluoroscopy of Left Heart using Low Osmolar Contrast (08/15/2017)  Fluoroscopy of Multiple Coronary Arteries using Low Osmolar Contrast (08/15/2017)  Measurement of Cardiac Sampling and Pressure, Left Heart, Percutaneous Approach (08/15/2017)  Appendectomy (1945)  cataract surgery  colonoscopy  H/O heart artery stent  hand surgery  History of bilateral hip replacements  History of cholecystectomy  hysterectomy  mamogram  right hand  right wrist fusion   Medications  Inpatient  Ativan, 1 mg= 0.5 mL, IV Push, q2hr, PRN  lidocaine 1% injectable solution, 5 mL, Subcutaneous, Once  morphine, 2 mg= 0.5 mL, IV Push, q1hr, PRN  morphine, 4 mg= 1 mL, IV Push, q1hr, PRN  morphine, 2 mg= 0.5 mL, IV Push, q1hr, PRN  Robinul, 0.2 mg= 1 mL, IV Push, q6hr, PRN  Sodium Chloride 0.9% PF vial (For PICC Flush), 10 mL, IV Push, As Directed, PRN  Sodium Chloride 0.9% PF vial (For PICC Flush), 20 mL, IV Push, As Directed, PRN  Sodium Chloride 0.9% PF vial (For PICC Flush), 10 mL, IV Push, q12hr  Home  acetaminophen-codeine 300 mg-30 mg oral tablet, 1 tab(s), Oral, q6hr, PRN,? ?Not taking  amLODIPine 2.5 mg oral tablet, 2.5 mg= 1 tab(s), Oral, Daily  aspirin 81 mg oral Delayed Release (EC) tablet, 81 mg= 1 tab(s), Oral, Daily, 4 refills  Calcium 600+D, Oral, At Bedtime  Coreg 12.5 mg oral tablet, 12.5  mg= 1 tab(s), Oral, BIDWMeal, 11 refills  Fish Oil oral capsule, 1 cap(s), Oral, Daily  folic acid 1 mg oral tablet, 1 mg= 1 tab(s), Oral, Daily, 3 refills  gabapentin 300 mg oral capsule, 300 mg= 1 cap(s), Oral, TID, 1 refills  Humira 40 mg/0.8 mL subcutaneous kit, 40 mg= 0.8 mL, Subcutaneous, q2wk,? ?Not taking  Humira Pre-Filled Syringe 40 mg/0.4 mL subcutaneous kit  isosorbide MONOnitrate 60 mg oral tablet, Extended Release, 60 mg= 1 tab(s), Oral, Daily  Keppra 500 mg oral tablet, 1000 mg= 2 tab(s), Oral, BID, 4 refills  levothyroxine 88 mcg (0.088 mg) oral tablet, 88 mcg= 1 tab(s), Oral, Daily  lutein 20 mg oral tablet, 20 mg= 1 tab(s), Oral, Daily  methotrexate 2.5 mg oral tablet  metoprolol tartrate 50 mg oral tab, 50 mg= 1 tab(s), Oral, BID,? ?Not taking  nitroglycerin 0.4 mg sublingual TAB, 0.4 mg= 1 tab(s), SL, q5min, PRN  Pantoprazole 40 mg ORAL EC-Tablet, 40 mg= 1 tab(s), Oral, Daily, 1 refills  Plavix 75 mg oral tablet, 75 mg= 1 tab(s), Oral, Daily, 4 refills  prednisONE 5 mg oral tablet, 5 mg= 1 tab(s), Oral, Daily  rosuvastatin 20 mg oral tablet, 20 mg= 1 tab(s), Oral, qPM  Thera-M oral tablet, Oral, Daily  traMADol 50 mg oral tablet, 50 mg= 1 tab(s), Oral, q6hr,? ?Not taking  Vitamin C 1000 mg oral tablet, 1000 mg= 1 tab(s), Oral, Daily  Zyrtec 10 mg oral tablet, 10 mg= 1 tab(s), Oral, Daily  Allergies  Darvocet A500?(upset stomach)  Mobic?(upset stomach)  Norco?(upset stomach)  Percocet 10/325?(upset stomach)  Percodan?(upset stomach)  Robaxin?(upset stomach)  oxyCODONE?(upset stomach)  zolpidem?(amnesia)  Social History  Abuse/Neglect  No, 11/11/2020  No, No, Yes, 11/05/2020  No, No, Yes, 11/04/2020  No, 08/03/2020  Alcohol - Denies Alcohol Use, 12/03/2013  Current, 1-2 times per month, 08/04/2015  Employment/School  Retired, 08/04/2015  Exercise  Exercise duration: 0., 08/04/2015  Home/Environment  Lives with Alone., 08/04/2015  Nutrition/Health  Regular, 08/04/2015  Sexual  Sexually active: No.,  08/04/2016  Substance Use - Denies Substance Abuse, 12/03/2013  Never, 06/15/2020  Never, 08/04/2015  Tobacco - Denies Tobacco Use, 12/03/2013  Never (less than 100 in lifetime), No, 12/14/2020  Family History  Acute myocardial infarction: Negative: Mother.  Cancer: Father.  Cancer: Sister.  Heart attack.: Brother.  Primary malignant neoplasm of lung: Brother.Negative: Father and Sister.  Stroke: Sister.  Immunizations  Vaccine Date Status Comments   influenza virus vaccine, inactivated 10/06/2020 Given    influenza virus vaccine, inactivated 10/10/2019 Recorded    tetanus/diphtheria/pertussis, acel(Tdap) 10/06/2019 Given    measles/mumps/rubella virus vaccine 05/03/2019 Recorded    influenza virus vaccine, inactivated 10/16/2018 Recorded    pneumococcal 13-valent conjugate vaccine 01/16/2018 Given    influenza virus vaccine, inactivated 10/2017 Recorded [1/16/2018] per patient is october   influenza virus vaccine, inactivated 10/18/2016 Given    influenza virus vaccine, inactivated 10/30/2015 Recorded    tetanus toxoid 05/28/2015 Recorded [8/4/2015] 0.5ML AT [facility]   zoster vaccine live 05/28/2015 Recorded [8/4/2015] GIVEN AT [facility]   influenza virus vaccine, inactivated 10/07/2014 Recorded    pneumococcal 23-polyvalent vaccine 06/15/2005 Recorded

## 2022-09-13 NOTE — HISTORICAL [facility] CERNER
This is a historical note converted from Lily. Formatting and pictures may have been removed.  Please reference Cernel for original formatting and attached multimedia. Chief Complaint  Dizziness, weakness, visual changes, right arm tremors; intermittent x 2 weeks  History of Present Illness  Ms. Salas is a 86 year old female with a medical history that includes HTN, HLD, CAD/MI/stent, YOUNG, RA, OA, lumbar spondylosis, and hypothyroidism. [place] [facility] [place] for intermittent dizziness, weakness, RUE weakness, and jumping of her right arm and leg this afternoon; same episode occurred ~ 2 weeks ago, but resolved. She denies slurred speech, memory deficits, or gait abnormality. Denies bladder/bowel incontinence or tongue bitting. She is a poor historian, limiting HPI. Currently without complaints or deficits.?  Initial ER VS: /73, HR 70, respirations 16, temporal artery temperature 36.3, and SPO2 99% on RA.? CMP, coags, and CBC unremarkable. EKG sinus. CT Head with new small area of hypoattenuation the anterior left parietal lobe subcortical white matter. She was given ASA 325mg and started on IVF while in the ER. Admitted to the hospitalist services for further evaluation and management of suspected acute CVA with possible focal seizures.  Review of Systems  Except as documented, all other systems reviewed and negative.  Physical Exam  Vitals & Measurements  T:?36.3? ?C (Temporal Artery)? HR:?66(Peripheral)? HR:?66(Monitored)? RR:?20? BP:?160/8? SpO2:?96%?  General: Appears comfortable, no acute distress.  Cognition and speech:?Oriented, speech clear and coherent.  HEENT:?Normocephalic, normal hearing, oral mucosa is moist.  Neck:?No JVD, trachea at?midline, supple.  Respiratory:?Lungs CTA.?No accessory muscle use. ?Breath sounds are equal.  Cardiovascular:?Regular rhythm. Normal S1/S2.? No pedal edema.  Gastrointestinal:?Normoactive bowel sound, soft and non-tender.  Integumentary:?Warm, dry,  intact.  Musculoskeletal:?Normal strength, no tenderness, no swelling.  Skin:?Warm and dry, no rashes or lesions.  Neuro:?AAOx3, no focal neurological deficit, normal sensory. Follows commands.  Psych:?Cooperative. Appropriate mood and affect.  ?  Assessment:  Left Parietal Lobe Hypoattenuation W RUE Weakness - resolved  ? Focal Seizure RUE  Hypertension, Essential - uncontrolled  HX: HLD, CAD/MI/stent, YOUNG, RA, OA, Lumbar spondylosis, Hypothyroidism  ?  Plan:?  - CT Head - new small area of hypoattenuation the anterior left parietal lobe subcortical white matter  - Carotid US - B ICA < 50% stenosis  - MRI Brain, ECHO, labs pending  - Aspirin 81mg PO daily; 325mg PO x 1 given in ER  - Telemetry monitoring; EKG sinus  - NPO; NS @ 75ml/hr  - PRN antihypertensives per CVA protocol  - OT/ST/PT, Neurology consulted  - EEG in am; seizure precautions  - REVIEW/RESUME HOME medications when med list obtained  ?  Source of history: Self. Medical record.?  Present at bedside: None.?  History limitation: None.  Provider information: PCP:?POLLO Cortez NP  ?   Code status: Full code  DVT prophylaxis: Lovenox 40mg SC daily?  ?  IMaria A FNP-C, reviewed and discussed the case with Dr. Stephon Stewart.  ?   I, Stephon Stewart MD, assumed care of this patient at the time of this addendum and assisted with the composition of the above assessment and plan. For this patient encounter, I reviewed the NP or PA documentation, treatment plan, and medical decision making; and I had face-to-face time with this patient. ?Labs and imaging were reviewed and I agree with history, physical and medical decision making as detailed above. ??If patient has been admitted under observation status, it is with my approval and admitted under my care. ?At least 55 minutes have been spent on above H&P, in collaboration with above nurse practitioner.  ?  ?   86-year-old female brought for?right upper extremity?weakness and ataxia, possible seizure  like activity with twitching in her right upper extremity. ?On exam currently no focal neurologic deficits, however she has a superficial skin tear on her left forearm which is bandaged. ?Obtaining EEG, and continue stroke protocol with consultation to neurology  ?   Problem List/Past Medical History  Hypertension  Hyperlipidemia  CAD/MI/stent  YOUNG  Rheumatoid arthritis  Osteoporosis  Lumbar spondylosis  Hypothyroidism  Procedure/Surgical History  Appendectomy (1945)  cataract surgery  colonoscopy  H/O heart artery stent  hand surgery  History of bilateral hip replacements  History of cholecystectomy  hysterectomy  mamogram  right hand  right wrist fusion   Medications  Inpatient  aspirin 81 mg oral Delayed Release (EC) tablet, 81 mg= 1 tab(s), Oral, Daily  hydrALAZINE 20 mg/mL injectable solution, 10 mg= 0.5 mL, IV Push, q4hr, PRN  labetalol, 10 mg= 2 mL, IV Push, q10min, PRN  Lovenox 40 mg/0.4 mL subcutaneous solution, 40 mg= 0.4 mL, Subcutaneous, Daily  Sodium Chloride 0.9% intravenous solution 1,000 mL, 1000 mL, IV  Home  acetaminophen-codeine 300 mg-30 mg oral tablet., 1 tab(s), Oral, q6hr  amLODIPine 2.5 mg oral tablet, 2.5 mg= 1 tab(s), Oral, Daily  aspirin 81 mg oral Delayed Release (EC) tablet, 81 mg= 1 tab(s), Oral, Daily, 4 refills  Calcium 600+D, Oral, At Bedtime  Fish Oil oral capsule, 1 cap(s), Oral, Daily  folic acid 1 mg oral tablet, 1 mg= 1 tab(s), Oral, Daily, 3 refills  gabapentin 300 mg oral capsule, 300 mg= 1 cap(s), Oral, TID, 1 refills  Humira 40 mg/0.8 mL subcutaneous kit, 40 mg= 0.8 mL, Subcutaneous, q2wk  isosorbide MONOnitrate 60 mg oral tablet, Extended Release, 60 mg= 1 tab(s), Oral, Daily  levothyroxine 88 mcg (0.088 mg) oral tablet, 88 mcg= 1 tab(s), Oral, Daily  lutein 20 mg oral tablet, 20 mg= 1 tab(s), Oral, Daily  methotrexate 2.5 mg oral tablet  metoprolol succinate 25 mg oral capsule, extended release, 25 mg= 1 cap(s), Oral, BID  metoprolol tartrate 50 mg oral tab, 50 mg= 1  tab(s), Oral, BID  nitroglycerin 0.4 mg sublingual TAB, 0.4 mg= 1 tab(s), SL, q5min, PRN  Pantoprazole 40 mg ORAL EC-Tablet, 40 mg= 1 tab(s), Oral, Daily, 1 refills  Plavix 75 mg oral tablet, 75 mg= 1 tab(s), Oral, Daily, 4 refills  prednisONE 5 mg oral tablet, 5 mg= 1 tab(s), Oral, Daily  rosuvastatin 20 mg oral tablet, 20 mg= 1 tab(s), Oral, qPM  Thera-M oral tablet, Oral, Daily  traMADol 50 mg oral tablet, 50 mg= 1 tab(s), Oral, q4hr, PRN  Vitamin C 1000 mg oral tablet, 1000 mg= 1 tab(s), Oral, Daily  Zyrtec 10 mg oral tablet, 10 mg= 1 tab(s), Oral, Daily  Allergies  Darvocet A500?(upset stomach)  Mobic?(upset stomach)  Norco?(upset stomach)  Percocet 10/325?(upset stomach)  Percodan?(upset stomach)  Robaxin?(upset stomach)  oxyCODONE?(upset stomach)  zolpidem?(amnesia)  Social History  Denies EtOH, illicit drug use, or tobacco use.  Family History  Acute myocardial infarction: Negative: Mother.  Cancer: Father.  Cancer: Sister.  Heart attack.: Brother.  Primary malignant neoplasm of lung: Brother.Negative: Father and Sister.  Stroke: Sister.  Lab Results  Labs Last 24 Hours?  ?Chemistry? Hematology/Coagulation?   Sodium Lvl: 142 mmol/L (11/04/20 20:52:00) PT: 13.5 second(s) (11/04/20 20:52:00)   Potassium Lvl: 4.1 mmol/L (11/04/20 20:52:00) INR: 1 (11/04/20 20:52:00)   Chloride: 103 mmol/L (11/04/20 20:52:00) PTT: 27.4 second(s) (11/04/20 20:52:00)   CO2: 30 mmol/L (11/04/20 20:52:00) WBC: 8 x10(3)/mcL (11/04/20 20:52:00)   Calcium Lvl: 9.4 mg/dL (11/04/20 20:52:00) RBC:?3.66 x10(6)/mcL?Low (11/04/20 20:52:00)   Magnesium Lvl: 2.4 mg/dL (11/04/20 20:52:00) Hgb:?11.9 gm/dL?Low (11/04/20 20:52:00)   Glucose Lvl:?127 mg/dL?High (11/04/20 20:52:00) Hct: 37.6 % (11/04/20 20:52:00)   BUN:?20.4 mg/dL?High (11/04/20 20:52:00) Platelet: 242 x10(3)/mcL (11/04/20 20:52:00)   Creatinine:?1.03 mg/dL?High (11/04/20 20:52:00) MCV:?102.7 fL?High (11/04/20 20:52:00)   eGFR-AA: >60 (11/04/20 20:52:00) MCH:?32.5 pg?High (11/04/20  20:52:00)   eGFR-JANIE: 54 mL/min/1.73 m2 (11/04/20 20:52:00) MCHC:?31.6 gm/dL?Low (11/04/20 20:52:00)   Bili Total: 0.6 mg/dL (11/04/20 20:52:00) RDW: 13.7 % (11/04/20 20:52:00)   Bili Direct: 0.2 mg/dL (11/04/20 20:52:00) MPV: 9.9 fL (11/04/20 20:52:00)   Bili Indirect: 0.4 mg/dL (11/04/20 20:52:00) Abs Neut: 4.05 x10(3)/mcL (11/04/20 20:52:00)   AST: 26 unit/L (11/04/20 20:52:00) Neut Man: 57 % (11/04/20 20:52:00)   ALT: 21 unit/L (11/04/20 20:52:00) Lymph Man: 27 % (11/04/20 20:52:00)   Alk Phos: 108 unit/L (11/04/20 20:52:00) Monocyte Man:?13 %?High (11/04/20 20:52:00)   Total Protein: 7 gm/dL (11/04/20 20:52:00) Eos Man: 1 % (11/04/20 20:52:00)   Albumin Lvl: 4.2 gm/dL (11/04/20 20:52:00) Basophil Man: 2 % (11/04/20 20:52:00)   Globulin: 2.8 gm/dL (11/04/20 20:52:00) Anisocyte:?1?High (11/04/20 20:52:00)   A/G Ratio: 1.5 ratio (11/04/20 20:52:00) Macrocyte:?1 /mcL?High (11/04/20 20:52:00)   Phosphorus: 3.4 mg/dL (11/04/20 20:52:00)    Diagnostic Results  Order:?CT Head W/O Contrast  Report Dt/Tm:?11/04/2020 21:53  IMPRESSION:?  No acute intracranial hemorrhage.  ?  Chronic changes as above.  ?   New small area of hypoattenuation the anterior left parietal lobe  subcortical white matter. Consider MRI correlation.

## 2022-09-13 NOTE — HISTORICAL [facility] CERNER
This is a historical note converted from Cernel. Formatting and pictures may have been removed.  Please reference Cernel for original formatting and attached multimedia. Admit and Discharge Dates  Admit Date: 11/04/2020  Discharge Date: 11/11/2020  Physicians  Attending Physician - Terry SPENCER, Stephon ABAD  Admitting Physician - Terry SPENCER, Stephon ABAD  Consulting Physician - Daniela SPENCER, Natalia  Consulting Physician - Roya SPENCER, Perry CAMPBELL  Consulting Physician - Joss SPENCER, Dain  Primary Care Physician - Sharon Riojas  Discharge Diagnosis  Abnormal CT of brain?R90.89  Focal seizure?R56.9  Meningioma?D32.9  Weakness or fatigue?[id number]  Left frontal lobe subacute infarct  Frontal lobe posterior infarct contains old hemorrhagic products  Essential hypertension  Hyperlipidemia  Coronary artery disease status post PCI  Carotid artery disease  Rheumatoid arthritis  Osteoarthritis  Lumbar spondylosis  Mild acute kidney injury  Hypothyroidism  Seizures  Surgical Procedures  No procedures recorded for this visit.  Immunizations  No immunizations recorded for this visit.  Admission Information  86 year old female with a medical history that includes HTN, HLD, CAD/MI/stent, YOUNG, RA, OA, lumbar spondylosis, and hypothyroidism. [facility] with her for intermittent dizziness, weakness, RUE weakness, and jumping of her right arm and leg this afternoon; same episode occurred ~ 2 weeks ago, but resolved. She denies slurred speech, memory deficits, or gait abnormality. Denies bladder/bowel incontinence or tongue bitting. She is a poor historian, limiting HPI. Currently without complaints or deficits.?  Initial ER VS: /73, HR 70, respirations 16, temporal artery temperature 36.3, and SPO2 99% on RA.? CMP, coags, and CBC unremarkable. EKG sinus. CT Head with new small area of hypoattenuation the anterior left parietal lobe subcortical white matter. She was given ASA 325mg and  started on IVF while in the ER. Admitted to the hospitalist services for further evaluation and management of suspected acute CVA with possible focal seizures  Patient was admitted to hospitalist service MRI of the brain without contrast?showed left frontal lobe subacute infarct, carotid ultrasound showed less than 50% stenosis bilaterally  CTA of the head and neck negative? mRI of the brain with contrast was ordered?that showed?few prominent regions of enhancement?in the left cerebral hemisphere?while somewhat atypical these regions most likely represent?sequelae?of infarction,?neoplastic process is not entirely excluded?so a repeat MRI in 4 to 6 weeks with contrast?recommended?and patient can have it done?once he is following up with neurology or her primary care physician  2D echo shows bubble study positive so cardiology was consulted and they are recommending continuing DAPT?and high intensity statin?plan for 2-week?MCT?to evaluate for A. fib?no indication for PFO closure at this point?and they will follow up with the patient outpatient?and at that time they can determine if ISABEL needs to be done. ?Patient had?seizure-like activity?last night. ?We are still awaiting EEG results from November 8, 2020  Patient at this time has been cleared by neurology and neurosurgery for discharge. ?I have prescribed her Keppra 1000 twice daily?for seizures. ?She will need a repeat imaging?of brain with MRI?to rule out neoplastic?process.? Patient will be followed up with outpatient neurology.? At this time she will be going to?see [facility] bed.? She was seen this morning doing well. ?No complaints. ?No more seizure-like activity.  Time Spent on discharge  35 minutes  Objective  Vitals & Measurements  T:?36.7? ?C (Oral)? TMIN:?36.5? ?C (Oral)? TMAX:?36.9? ?C (Oral)? HR:?62(Peripheral)? RR:?18? BP:?151/71? SpO2:?99%?  Physical Exam  General: In no acute distress, afebrile  Chest: Clear to auscultation bilaterally  Heart: S1,  S2, no appreciable murmur  Abdomen: Soft, nontender, BS +  MSK: Warm, no lower extremity edema, no clubbing or cyanosis  Neurologic: Alert and oriented x4, moving all extremities with good strength  Patient Discharge Condition  Improved and stable  [facility] bed   Discharge Medication Reconciliation  Prescribed  levETIRAcetam (Keppra 500 mg oral tablet)?1,000 mg, Oral, BID  Continue  acetaminophen-codeine (acetaminophen-codeine 300 mg-30 mg oral tablet.)?1 tab(s), Oral, q6hr  adalimumab (Humira 40 mg/0.8 mL subcutaneous kit)?40 mg, Subcutaneous, q2wk  amLODIPine (amLODIPine 2.5 mg oral tablet)?2.5 mg, Oral, Daily  ascorbic acid (Vitamin C 1000 mg oral tablet)?1,000 mg, Oral, Daily  aspirin (aspirin 81 mg oral Delayed Release (EC) tablet)?81 mg, Oral, Daily  calcium-vitamin D (Calcium 600+D), Oral, At Bedtime  cetirizine (Zyrtec 10 mg oral tablet)?10 mg, Oral, Daily  clopidogrel (Plavix 75 mg oral tablet)?75 mg, Oral, Daily  folic acid (folic acid 1 mg oral tablet)?1 mg, Oral, Daily  gabapentin (gabapentin 300 mg oral capsule)?300 mg, Oral, TID  isosorbide mononitrate (isosorbide MONOnitrate 60 mg oral tablet, Extended Release)?60 mg, Oral, Daily  lutein (lutein 20 mg oral tablet)?20 mg, Oral, Daily  methotrexate (methotrexate 2.5 mg oral tablet)  metoprolol (metoprolol tartrate 50 mg oral tab)?50 mg, Oral, BID  multivitamin with minerals (Thera-M oral tablet), Oral, Daily  nitroglycerin (nitroglycerin 0.4 mg sublingual TAB)?0.4 mg, SL, q5min, PRN as needed for chest pain  omega-3 polyunsaturated fatty acids (Fish Oil oral capsule)?1 cap(s), Oral, Daily  pantoprazole (Pantoprazole 40 mg ORAL EC-Tablet)?40 mg, Oral, Daily  predniSONE (prednisONE 5 mg oral tablet)?5 mg, Oral, Daily  rosuvastatin (rosuvastatin 20 mg oral tablet)?20 mg, Oral, qPM  Discontinue  metoprolol (metoprolol succinate 25 mg oral capsule, extended release)?25 mg, Oral, BID  traMADol (traMADol 50 mg oral tablet)?50 mg, Oral, q4hr, PRN for  pain  Education and Orders Provided  Stroke Prevention, Easy-to-Read  Ischemic Stroke, Easy-to-Read  Non-Epileptic Seizures, Adult  High Triglycerides Eating Plan  Discharge - 11/10/20 13:55:00 CST, DC/Trans to Rehb Facility?  Discharge - 11/11/20 14:19:00 CST, Dis/Trn Another Type Inst not defined, Give all scheduled vaccinations prior to discharge.?  Discharge Activity - Activity as Tolerated?  Discharge Diet - Low Sodium?  Follow up  Félix Licona, within 1 to 2 weeks  ????Hospital Follow Up- Needs 2-Week Preventice Monitor at time of discharge. Re: Acute Stroke Rule Out AFIB/AFLPlease schedule upon discharge from Rehab  Perry Pryor, within 2 to 4 weeks  ????Within 2 to 4 weeks with a repeat MRIPlease schedule upon discharge from Rehab  [employer]  No Qualifying Data

## 2022-09-15 ENCOUNTER — HISTORICAL (OUTPATIENT)
Dept: ADMINISTRATIVE | Facility: HOSPITAL | Age: 87
End: 2022-09-15